# Patient Record
Sex: FEMALE | Race: WHITE | NOT HISPANIC OR LATINO | URBAN - METROPOLITAN AREA
[De-identification: names, ages, dates, MRNs, and addresses within clinical notes are randomized per-mention and may not be internally consistent; named-entity substitution may affect disease eponyms.]

---

## 2024-02-11 ENCOUNTER — INPATIENT (INPATIENT)
Facility: HOSPITAL | Age: 54
LOS: 3 days | Discharge: HOME | DRG: 139 | End: 2024-02-15
Attending: STUDENT IN AN ORGANIZED HEALTH CARE EDUCATION/TRAINING PROGRAM | Admitting: HOSPITALIST
Payer: MEDICAID

## 2024-02-11 VITALS
DIASTOLIC BLOOD PRESSURE: 77 MMHG | SYSTOLIC BLOOD PRESSURE: 121 MMHG | HEART RATE: 80 BPM | RESPIRATION RATE: 18 BRPM | WEIGHT: 240.08 LBS | OXYGEN SATURATION: 95 % | TEMPERATURE: 98 F

## 2024-02-11 DIAGNOSIS — F11.20 OPIOID DEPENDENCE, UNCOMPLICATED: ICD-10-CM

## 2024-02-11 DIAGNOSIS — R91.8 OTHER NONSPECIFIC ABNORMAL FINDING OF LUNG FIELD: ICD-10-CM

## 2024-02-11 DIAGNOSIS — Z87.891 PERSONAL HISTORY OF NICOTINE DEPENDENCE: ICD-10-CM

## 2024-02-11 DIAGNOSIS — J18.1 LOBAR PNEUMONIA, UNSPECIFIED ORGANISM: ICD-10-CM

## 2024-02-11 DIAGNOSIS — R79.89 OTHER SPECIFIED ABNORMAL FINDINGS OF BLOOD CHEMISTRY: ICD-10-CM

## 2024-02-11 DIAGNOSIS — R06.02 SHORTNESS OF BREATH: ICD-10-CM

## 2024-02-11 DIAGNOSIS — I10 ESSENTIAL (PRIMARY) HYPERTENSION: ICD-10-CM

## 2024-02-11 DIAGNOSIS — R59.0 LOCALIZED ENLARGED LYMPH NODES: ICD-10-CM

## 2024-02-11 DIAGNOSIS — U09.9 POST COVID-19 CONDITION, UNSPECIFIED: ICD-10-CM

## 2024-02-11 DIAGNOSIS — F41.1 GENERALIZED ANXIETY DISORDER: ICD-10-CM

## 2024-02-11 DIAGNOSIS — Z98.51 TUBAL LIGATION STATUS: Chronic | ICD-10-CM

## 2024-02-11 DIAGNOSIS — C34.32 MALIGNANT NEOPLASM OF LOWER LOBE, LEFT BRONCHUS OR LUNG: ICD-10-CM

## 2024-02-11 LAB
ALBUMIN SERPL ELPH-MCNC: 4.2 G/DL — SIGNIFICANT CHANGE UP (ref 3.5–5.2)
ALP SERPL-CCNC: 85 U/L — SIGNIFICANT CHANGE UP (ref 30–115)
ALT FLD-CCNC: 37 U/L — SIGNIFICANT CHANGE UP (ref 0–41)
ANION GAP SERPL CALC-SCNC: 8 MMOL/L — SIGNIFICANT CHANGE UP (ref 7–14)
AST SERPL-CCNC: 35 U/L — SIGNIFICANT CHANGE UP (ref 0–41)
BASE EXCESS BLDV CALC-SCNC: 2.8 MMOL/L — SIGNIFICANT CHANGE UP (ref -2–3)
BASOPHILS # BLD AUTO: 0.01 K/UL — SIGNIFICANT CHANGE UP (ref 0–0.2)
BASOPHILS NFR BLD AUTO: 0.1 % — SIGNIFICANT CHANGE UP (ref 0–1)
BILIRUB SERPL-MCNC: 0.4 MG/DL — SIGNIFICANT CHANGE UP (ref 0.2–1.2)
BUN SERPL-MCNC: 15 MG/DL — SIGNIFICANT CHANGE UP (ref 10–20)
CA-I SERPL-SCNC: 1.16 MMOL/L — SIGNIFICANT CHANGE UP (ref 1.15–1.33)
CALCIUM SERPL-MCNC: 8.9 MG/DL — SIGNIFICANT CHANGE UP (ref 8.4–10.5)
CHLORIDE SERPL-SCNC: 99 MMOL/L — SIGNIFICANT CHANGE UP (ref 98–110)
CO2 SERPL-SCNC: 28 MMOL/L — SIGNIFICANT CHANGE UP (ref 17–32)
CREAT SERPL-MCNC: 1.1 MG/DL — SIGNIFICANT CHANGE UP (ref 0.7–1.5)
EGFR: 60 ML/MIN/1.73M2 — SIGNIFICANT CHANGE UP
EOSINOPHIL # BLD AUTO: 0.05 K/UL — SIGNIFICANT CHANGE UP (ref 0–0.7)
EOSINOPHIL NFR BLD AUTO: 0.5 % — SIGNIFICANT CHANGE UP (ref 0–8)
FLUAV AG NPH QL: SIGNIFICANT CHANGE UP
FLUBV AG NPH QL: SIGNIFICANT CHANGE UP
GAS PNL BLDV: 133 MMOL/L — LOW (ref 136–145)
GAS PNL BLDV: SIGNIFICANT CHANGE UP
GLUCOSE SERPL-MCNC: 118 MG/DL — HIGH (ref 70–99)
HCG SERPL QL: NEGATIVE — SIGNIFICANT CHANGE UP
HCO3 BLDV-SCNC: 31 MMOL/L — HIGH (ref 22–29)
HCT VFR BLD CALC: 42 % — SIGNIFICANT CHANGE UP (ref 37–47)
HGB BLD-MCNC: 14 G/DL — SIGNIFICANT CHANGE UP (ref 12–16)
IMM GRANULOCYTES NFR BLD AUTO: 0.4 % — HIGH (ref 0.1–0.3)
LACTATE BLDV-MCNC: 2.1 MMOL/L — HIGH (ref 0.5–2)
LACTATE SERPL-SCNC: 2.2 MMOL/L — HIGH (ref 0.7–2)
LYMPHOCYTES # BLD AUTO: 2.18 K/UL — SIGNIFICANT CHANGE UP (ref 1.2–3.4)
LYMPHOCYTES # BLD AUTO: 21.6 % — SIGNIFICANT CHANGE UP (ref 20.5–51.1)
MCHC RBC-ENTMCNC: 29.5 PG — SIGNIFICANT CHANGE UP (ref 27–31)
MCHC RBC-ENTMCNC: 33.3 G/DL — SIGNIFICANT CHANGE UP (ref 32–37)
MCV RBC AUTO: 88.4 FL — SIGNIFICANT CHANGE UP (ref 81–99)
MONOCYTES # BLD AUTO: 0.76 K/UL — HIGH (ref 0.1–0.6)
MONOCYTES NFR BLD AUTO: 7.5 % — SIGNIFICANT CHANGE UP (ref 1.7–9.3)
NEUTROPHILS # BLD AUTO: 7.05 K/UL — HIGH (ref 1.4–6.5)
NEUTROPHILS NFR BLD AUTO: 69.9 % — SIGNIFICANT CHANGE UP (ref 42.2–75.2)
NRBC # BLD: 0 /100 WBCS — SIGNIFICANT CHANGE UP (ref 0–0)
NT-PROBNP SERPL-SCNC: 178 PG/ML — SIGNIFICANT CHANGE UP (ref 0–300)
PCO2 BLDV: 61 MMHG — HIGH (ref 39–42)
PH BLDV: 7.31 — LOW (ref 7.32–7.43)
PLATELET # BLD AUTO: 155 K/UL — SIGNIFICANT CHANGE UP (ref 130–400)
PMV BLD: 11.1 FL — HIGH (ref 7.4–10.4)
PO2 BLDV: 62 MMHG — HIGH (ref 25–45)
POTASSIUM BLDV-SCNC: 5.7 MMOL/L — HIGH (ref 3.5–5.1)
POTASSIUM SERPL-MCNC: 4.8 MMOL/L — SIGNIFICANT CHANGE UP (ref 3.5–5)
POTASSIUM SERPL-SCNC: 4.8 MMOL/L — SIGNIFICANT CHANGE UP (ref 3.5–5)
PROCALCITONIN SERPL-MCNC: 0.08 NG/ML — SIGNIFICANT CHANGE UP (ref 0.02–0.1)
PROT SERPL-MCNC: 7.5 G/DL — SIGNIFICANT CHANGE UP (ref 6–8)
RBC # BLD: 4.75 M/UL — SIGNIFICANT CHANGE UP (ref 4.2–5.4)
RBC # FLD: 13.7 % — SIGNIFICANT CHANGE UP (ref 11.5–14.5)
RSV RNA NPH QL NAA+NON-PROBE: SIGNIFICANT CHANGE UP
SAO2 % BLDV: 89.9 % — HIGH (ref 67–88)
SARS-COV-2 RNA SPEC QL NAA+PROBE: SIGNIFICANT CHANGE UP
SODIUM SERPL-SCNC: 135 MMOL/L — SIGNIFICANT CHANGE UP (ref 135–146)
TROPONIN T, HIGH SENSITIVITY RESULT: 32 NG/L — HIGH (ref 6–13)
TROPONIN T, HIGH SENSITIVITY RESULT: 38 NG/L — HIGH (ref 6–13)
WBC # BLD: 10.09 K/UL — SIGNIFICANT CHANGE UP (ref 4.8–10.8)
WBC # FLD AUTO: 10.09 K/UL — SIGNIFICANT CHANGE UP (ref 4.8–10.8)

## 2024-02-11 PROCEDURE — 93010 ELECTROCARDIOGRAM REPORT: CPT

## 2024-02-11 PROCEDURE — 85025 COMPLETE CBC W/AUTO DIFF WBC: CPT

## 2024-02-11 PROCEDURE — 36415 COLL VENOUS BLD VENIPUNCTURE: CPT

## 2024-02-11 PROCEDURE — 71045 X-RAY EXAM CHEST 1 VIEW: CPT | Mod: 26

## 2024-02-11 PROCEDURE — 94640 AIRWAY INHALATION TREATMENT: CPT

## 2024-02-11 PROCEDURE — 0225U NFCT DS DNA&RNA 21 SARSCOV2: CPT

## 2024-02-11 PROCEDURE — 99285 EMERGENCY DEPT VISIT HI MDM: CPT

## 2024-02-11 PROCEDURE — 84145 PROCALCITONIN (PCT): CPT

## 2024-02-11 PROCEDURE — 99222 1ST HOSP IP/OBS MODERATE 55: CPT

## 2024-02-11 PROCEDURE — 83605 ASSAY OF LACTIC ACID: CPT

## 2024-02-11 PROCEDURE — 71275 CT ANGIOGRAPHY CHEST: CPT

## 2024-02-11 PROCEDURE — 93306 TTE W/DOPPLER COMPLETE: CPT

## 2024-02-11 PROCEDURE — 71275 CT ANGIOGRAPHY CHEST: CPT | Mod: 26

## 2024-02-11 PROCEDURE — 80053 COMPREHEN METABOLIC PANEL: CPT

## 2024-02-11 RX ORDER — LANOLIN ALCOHOL/MO/W.PET/CERES
3 CREAM (GRAM) TOPICAL AT BEDTIME
Refills: 0 | Status: DISCONTINUED | OUTPATIENT
Start: 2024-02-11 | End: 2024-02-15

## 2024-02-11 RX ORDER — IPRATROPIUM/ALBUTEROL SULFATE 18-103MCG
3 AEROSOL WITH ADAPTER (GRAM) INHALATION EVERY 4 HOURS
Refills: 0 | Status: DISCONTINUED | OUTPATIENT
Start: 2024-02-11 | End: 2024-02-15

## 2024-02-11 RX ORDER — ACETAMINOPHEN 500 MG
650 TABLET ORAL EVERY 6 HOURS
Refills: 0 | Status: DISCONTINUED | OUTPATIENT
Start: 2024-02-11 | End: 2024-02-15

## 2024-02-11 RX ORDER — ONDANSETRON 8 MG/1
4 TABLET, FILM COATED ORAL EVERY 8 HOURS
Refills: 0 | Status: DISCONTINUED | OUTPATIENT
Start: 2024-02-11 | End: 2024-02-15

## 2024-02-11 RX ORDER — IPRATROPIUM/ALBUTEROL SULFATE 18-103MCG
3 AEROSOL WITH ADAPTER (GRAM) INHALATION ONCE
Refills: 0 | Status: COMPLETED | OUTPATIENT
Start: 2024-02-11 | End: 2024-02-11

## 2024-02-11 RX ORDER — ALPRAZOLAM 0.25 MG
2 TABLET ORAL THREE TIMES A DAY
Refills: 0 | Status: DISCONTINUED | OUTPATIENT
Start: 2024-02-11 | End: 2024-02-15

## 2024-02-11 RX ORDER — LISINOPRIL 2.5 MG/1
10 TABLET ORAL DAILY
Refills: 0 | Status: DISCONTINUED | OUTPATIENT
Start: 2024-02-11 | End: 2024-02-15

## 2024-02-11 RX ORDER — ENOXAPARIN SODIUM 100 MG/ML
40 INJECTION SUBCUTANEOUS EVERY 24 HOURS
Refills: 0 | Status: DISCONTINUED | OUTPATIENT
Start: 2024-02-11 | End: 2024-02-15

## 2024-02-11 RX ORDER — CHLORHEXIDINE GLUCONATE 213 G/1000ML
1 SOLUTION TOPICAL
Refills: 0 | Status: DISCONTINUED | OUTPATIENT
Start: 2024-02-11 | End: 2024-02-15

## 2024-02-11 RX ORDER — METHADONE HYDROCHLORIDE 40 MG/1
120 TABLET ORAL
Refills: 0 | DISCHARGE

## 2024-02-11 RX ORDER — CEFTRIAXONE 500 MG/1
1000 INJECTION, POWDER, FOR SOLUTION INTRAMUSCULAR; INTRAVENOUS ONCE
Refills: 0 | Status: COMPLETED | OUTPATIENT
Start: 2024-02-11 | End: 2024-02-11

## 2024-02-11 RX ORDER — GUAIFENESIN/DEXTROMETHORPHAN 600MG-30MG
10 TABLET, EXTENDED RELEASE 12 HR ORAL EVERY 6 HOURS
Refills: 0 | Status: DISCONTINUED | OUTPATIENT
Start: 2024-02-11 | End: 2024-02-15

## 2024-02-11 RX ORDER — CEFTRIAXONE 500 MG/1
1000 INJECTION, POWDER, FOR SOLUTION INTRAMUSCULAR; INTRAVENOUS EVERY 24 HOURS
Refills: 0 | Status: DISCONTINUED | OUTPATIENT
Start: 2024-02-11 | End: 2024-02-15

## 2024-02-11 RX ORDER — INFLUENZA VIRUS VACCINE 15; 15; 15; 15 UG/.5ML; UG/.5ML; UG/.5ML; UG/.5ML
0.5 SUSPENSION INTRAMUSCULAR ONCE
Refills: 0 | Status: DISCONTINUED | OUTPATIENT
Start: 2024-02-11 | End: 2024-02-15

## 2024-02-11 RX ORDER — IPRATROPIUM/ALBUTEROL SULFATE 18-103MCG
3 AEROSOL WITH ADAPTER (GRAM) INHALATION
Refills: 0 | Status: DISCONTINUED | OUTPATIENT
Start: 2024-02-11 | End: 2024-02-15

## 2024-02-11 RX ORDER — AZITHROMYCIN 500 MG/1
500 TABLET, FILM COATED ORAL ONCE
Refills: 0 | Status: COMPLETED | OUTPATIENT
Start: 2024-02-11 | End: 2024-02-11

## 2024-02-11 RX ORDER — AZITHROMYCIN 500 MG/1
500 TABLET, FILM COATED ORAL EVERY 24 HOURS
Refills: 0 | Status: DISCONTINUED | OUTPATIENT
Start: 2024-02-11 | End: 2024-02-15

## 2024-02-11 RX ORDER — ALPRAZOLAM 0.25 MG
1 TABLET ORAL
Refills: 0 | DISCHARGE

## 2024-02-11 RX ADMIN — ENOXAPARIN SODIUM 40 MILLIGRAM(S): 100 INJECTION SUBCUTANEOUS at 20:52

## 2024-02-11 RX ADMIN — CEFTRIAXONE 100 MILLIGRAM(S): 500 INJECTION, POWDER, FOR SOLUTION INTRAMUSCULAR; INTRAVENOUS at 15:06

## 2024-02-11 RX ADMIN — Medication 125 MILLIGRAM(S): at 12:32

## 2024-02-11 RX ADMIN — AZITHROMYCIN 255 MILLIGRAM(S): 500 TABLET, FILM COATED ORAL at 15:06

## 2024-02-11 RX ADMIN — Medication 3 MILLILITER(S): at 12:32

## 2024-02-11 RX ADMIN — Medication 3 MILLILITER(S): at 15:12

## 2024-02-11 NOTE — PATIENT PROFILE ADULT - FALL HARM RISK - HARM RISK INTERVENTIONS

## 2024-02-11 NOTE — PATIENT PROFILE ADULT - NSPROPOAURINARYCATHETER_GEN_A_NUR
Pt due for appt & fasting BW ordered by neurologist(ordered 11/7)    pls schedule for February with me    thx no

## 2024-02-11 NOTE — PATIENT PROFILE ADULT - DEAF OR HARD OF HEARING?
ENDOCRINOLOGY CLINIC FOLLOW-UP:    Reason for Visit: primary hyperparathyroidism s/p parathyroidectomy     Last seen in clinic: 4/27/23    Interval events: surgery went well had   On 8/7/23, she underwent left superior parathyroidectomy. Takes vitamin D supplement 1,000 IU three days a week. No osteoporosis. No fractures.     History of Present Illness:   Patient said she was found to have hypercalcemia in 2022. Prior to this was followed at [facility]. Had elevated ionized calcium (11/2022) with elevated PTH level.     Fatigue: no  Depression: no   Increased urination or thirst: no   Bone pain: no  Constipation: no  Abdominal pain: no  Memory difficulties: no  Hx of kidney stones: no     Family history of calcium problems or parathyroid disease: No     Taking any thiazide diuretic or lithium? No    Any history of osteoporosis? If yes, has patient been treated with any medications?  She does not have osteoporosis. Had a DXA scan about five years ago through other health system and was normal. No hx of fractures.    No previous sestamibi scan.     The patient is taking 1,000 IU of vitamin D only three days a week. Takes a multivitamin every day. Does not take any calcium supplements.   Eats dairy.      Reviewed Labs:  Component      Latest Ref Rng 8/17/2023  7:03 AM   CALCIUM      8.4 - 10.2 mg/dL 9.5    PARATHYROID HORMONE      19 - 88 pg/mL 57    VITAMIND, 25 HYDROXY      30.0 - 100.0 ng/mL 38.7        Reviewed Imaging:  DXA scan 5/4/23:  Findings:  Lumbar spine:  L3-L4   1.499 g/cm2  T-score:  2.2   Z-score:  2.3     Lowest femoral:  Neck Right-  1.149 g/cm2  T-score:  0.8   Z-score:  1.3     Left Forearm:   Radius 33%: 0.825 g/cm2  T-score:  -0.6   Z-score: 0.4  Forearm 1/3 radius LSC: 0.055 g/cm2  The forearm was not scanned previously.     All other bone mineral density measurements and statistics are listed in  the computer-generated report available in [employer].     FRAX Risk Assessment:  The  estimated  10-year risk for a major osteoporotic fracture is  N/A% and  for a hip fracture is N/A%.  FRAX is generated if the patient's lowest T-score is between -1.0 and  -2.4.    Note* FRAX  is not valid if  the patient is under age 40,  has T score of  2.5 at the spine or hip, has history of hip or vertebral (clinical  or morphometric) fracture, or is on treatment. [email or web address]     IMPRESSION:   1. Normal bone mineral density.   2.  Optimize dietary intake of vitamin D.  3.  Consider repeat bone mineral density testing in 2 years, as clinically  appropriate.      Review of Systems:  No complaints at this time.    History:   Past medical history, surgical history, medications, allergies, family history and social history reviewed and in Epic.    Physical Exam  Visit Vitals  BP (!) 141/76   Pulse 66   Ht 5' 4\" (1.626 m)   Wt 109.6 kg (241 lb 10 oz)   LMP 12/18/2005   BMI 41.47 kg/m²   Constitutional: Resting, well nourished female and NAD.  Eyes: Anicteric and conjunctivae not pale. No exophthalmos  Head: Normocephalic, atraumatic  Neck: No overt thyromegaly  Respiratory: Non-labored. No cyanosis.   Skin: No visible rashes.    Psych: Normal mood and affect.  Musculoskeletal: JOSEPH x4, no deformities  Neuro: Awake, alert. No tremor. Speech normal.       Assessment:  Johanna Greer is a pleasant 60 year old female with medical history including primary hyperparathyroidism s/p left superior 8/7/23 with Dr. Phoenix, who presents for f/u. She does not have osteoporosis. Doing well. Taking vitamin D supplement. Eats dairy daily.    RTC: prn    I spent a total of 30 minutes on the day of the visit.  This includes pre-charting, chart review, and documenting, counseling.    Scarlet Bach MD, A  Endocrinology                  no

## 2024-02-11 NOTE — ED PROVIDER NOTE - ATTENDING APP SHARED VISIT CONTRIBUTION OF CARE
Pt reports cough, shortness of breath, at home saturations in the 80'S. Hx of methadone 120 mg, xanax 2 mg tid. Pt has COVID Dec 2022 and since then her lungs have not recovered. no chest pain. No leg pain. on exam S1S2 reg, tachy, lungs bilateral rhonchi and wet rales, no abdominal tenderness, no edema of lower extremity.

## 2024-02-11 NOTE — ED ADULT TRIAGE NOTE - CHIEF COMPLAINT QUOTE
BIBA via [facility] from home- pt complaining of shortness of breath and fever that has been present since yesterday.

## 2024-02-11 NOTE — H&P ADULT - NS ATTEND AMEND GEN_ALL_CORE FT
Pt presents with complaints of SOB and "gurgly" breathing.    Overall impression is likely viral URI not detected on ER COVID/flu/RSV panel.  Send full RVP once out of ER.  Check procal, trend lactate.  -Pt is saturating at 95% on RA, CXR is clear  -Breath sounds are abnormal likely due to upper airway secretions-give expectorant  -Will get CTA chest to r/o PE (tachycardic, SOB) and for better eval of lung fields given normal appearing CXR as well as TTE (b/l LE edema with SOB and complaints of HOPE).  -Troponin has downtrended, no need to repeat, patient does not need telemetry.    Continue home meds as above, will need to verify methadone dose with program in [place].

## 2024-02-11 NOTE — H&P ADULT - NSHPPHYSICALEXAM_GEN_ALL_CORE
Vital Signs (24 Hrs):  T(C): 36.8 (02-11-24 @ 12:12), Max: 36.8 (02-11-24 @ 12:12)  HR: 80 (02-11-24 @ 12:12) (80 - 80)  BP: 121/77 (02-11-24 @ 12:12) (121/77 - 121/77)  RR: 18 (02-11-24 @ 12:12) (18 - 18)  SpO2: 95% (02-11-24 @ 12:12) (95% - 95%)    PHYSICAL EXAM:  GENERAL: NAD, well-developed  SKIN: No rashes or lesions  HEAD:  Atraumatic, Normocephalic  EYES: EOMI, PERRLA, conjunctiva and sclera clear  NECK: Supple, No JVD  CHEST/LUNG: + wheeze b/l  HEART: Regular rate and rhythm; No murmurs, rubs, or gallops  ABDOMEN: Soft, Nontender, Nondistended; Bowel sounds present  EXTREMITIES:  No clubbing, cyanosis. 1+ BLE pitting edema  CNS: AAOx3

## 2024-02-11 NOTE — ED PROVIDER NOTE - PHYSICAL EXAMINATION
Physical Exam    Vital Signs: I have reviewed the initial vital signs.  Constitutional: appears stated age, no acute distress  Eyes: Conjunctiva pink, Sclera clear,  Cardiovascular: S1 and S2, regular rate, regular rhythm, well-perfused extremities, radial pulses equal and 2+, pedal pulses 2+ and equal  Respiratory: unlabored respiratory effort, Diffuse rhonchi and end expiratory wheezing  Gastrointestinal: soft, non-tender abdomen, no pulsatile mass, normal bowl sounds  Musculoskeletal: supple neck, no lower extremity edema, no midline tenderness  Integumentary: warm, dry, no rash  Neurologic: awake, alert, nvi

## 2024-02-11 NOTE — H&P ADULT - ASSESSMENT
53F w/PMHx of HTN, Opiate Dependence on MMTP, ISSA presents to ED with her  with complaints of SOB, admitted to medicine service for further management    #SOB, unclear etiology  - admit to medicine service  - c/w CTX and Azithromycin  - b2 q4h ATC while awake and PRN  - Robitussin DM PRN  - check CTA PE protocol  - check Procal  - check full RVP  - check TTE  - recheck lactate in AM    #Elevated Troponin  - 38 -> 32, no further need to trend  - d/c tele    #Opiate Dependence  - on Methadone 120mg QD  - Dose needs to be verified: Please call [facility]MTP in AM  for verification.  [phone]    #HTN  - c/w Lisinopril  - DASH Diet    #ISSA  - Alprazolam PRN per home schedule  - istop Reference #: [id number]    Diet: DASH  Activity: OOB  DVT PPX: Loveonox  Code status: Full  Dispo: Home  CHG 2% Wipes QD

## 2024-02-11 NOTE — ED PROVIDER NOTE - OBJECTIVE STATEMENT
53-year-old female, past medical history of opiate dependency on methadone, takes Xanax, previous COVID infection 2022 states lungs have never been the same since then, presents emergency department shortness of breath several days.  Worse with exertion. Denies fever, chills, cp, abd pain, nvd, syncope, cough.

## 2024-02-11 NOTE — ED PROVIDER NOTE - NS_BEDUNITTYPES_ED_ALL_ED
Stelara Pregnancy And Lactation Text: This medication is Pregnancy Category B and is considered safe during pregnancy. It is unknown if this medication is excreted in breast milk. TELEMETRY

## 2024-02-11 NOTE — H&P ADULT - HISTORY OF PRESENT ILLNESS
53F w/PMHx of HTN, Opiate Dependence on MMTP, ISSA presents to ED with her  with complaints of SOB.  Patient reports worsening SOB and cough with fever over the past 7-10 days.   reports patient has been gurgling over the past week.  + cough, no production.  + Fever, unknown TMAX.  + HOPE, no SOB at rest.  No abdominal pain N/V.  No urinary symptoms  + associated weakness.  States had COVID-19 in 2022, since then has had multiple medical problems

## 2024-02-11 NOTE — H&P ADULT - NSHPLABSRESULTS_GEN_ALL_CORE
14.0   10.09 )-----------( 155      ( 11 Feb 2024 13:00 )             42.0       02-11    135  |  99  |  15  ----------------------------<  118<H>  4.8   |  28  |  1.1    Ca    8.9      11 Feb 2024 13:00    TPro  7.5  /  Alb  4.2  /  TBili  0.4  /  DBili  x   /  AST  35  /  ALT  37  /  AlkPhos  85  02-11                  Urinalysis Basic - ( 11 Feb 2024 13:00 )    Color: x / Appearance: x / SG: x / pH: x  Gluc: 118 mg/dL / Ketone: x  / Bili: x / Urobili: x   Blood: x / Protein: x / Nitrite: x   Leuk Esterase: x / RBC: x / WBC x   Sq Epi: x / Non Sq Epi: x / Bacteria: x      Lactate Trend  02-11 @ 16:00 Lactate:2.2     CXR    Impression:    Low lung volumes. No definite acute pulmonary process.              Serum Pregnancy: Negative (02-11-24 @ 13:00)

## 2024-02-12 LAB
ALBUMIN SERPL ELPH-MCNC: 3.9 G/DL — SIGNIFICANT CHANGE UP (ref 3.5–5.2)
ALP SERPL-CCNC: 72 U/L — SIGNIFICANT CHANGE UP (ref 30–115)
ALT FLD-CCNC: 45 U/L — HIGH (ref 0–41)
ANION GAP SERPL CALC-SCNC: 12 MMOL/L — SIGNIFICANT CHANGE UP (ref 7–14)
AST SERPL-CCNC: 71 U/L — HIGH (ref 0–41)
BASOPHILS # BLD AUTO: 0.01 K/UL — SIGNIFICANT CHANGE UP (ref 0–0.2)
BASOPHILS NFR BLD AUTO: 0.1 % — SIGNIFICANT CHANGE UP (ref 0–1)
BILIRUB SERPL-MCNC: 0.4 MG/DL — SIGNIFICANT CHANGE UP (ref 0.2–1.2)
BUN SERPL-MCNC: 24 MG/DL — HIGH (ref 10–20)
CALCIUM SERPL-MCNC: 8.9 MG/DL — SIGNIFICANT CHANGE UP (ref 8.4–10.5)
CHLORIDE SERPL-SCNC: 99 MMOL/L — SIGNIFICANT CHANGE UP (ref 98–110)
CO2 SERPL-SCNC: 26 MMOL/L — SIGNIFICANT CHANGE UP (ref 17–32)
CREAT SERPL-MCNC: 1.1 MG/DL — SIGNIFICANT CHANGE UP (ref 0.7–1.5)
EGFR: 60 ML/MIN/1.73M2 — SIGNIFICANT CHANGE UP
EOSINOPHIL # BLD AUTO: 0 K/UL — SIGNIFICANT CHANGE UP (ref 0–0.7)
EOSINOPHIL NFR BLD AUTO: 0 % — SIGNIFICANT CHANGE UP (ref 0–8)
GLUCOSE SERPL-MCNC: 140 MG/DL — HIGH (ref 70–99)
HCT VFR BLD CALC: 39.9 % — SIGNIFICANT CHANGE UP (ref 37–47)
HGB BLD-MCNC: 12.6 G/DL — SIGNIFICANT CHANGE UP (ref 12–16)
IMM GRANULOCYTES NFR BLD AUTO: 0.3 % — SIGNIFICANT CHANGE UP (ref 0.1–0.3)
LACTATE SERPL-SCNC: 1.9 MMOL/L — SIGNIFICANT CHANGE UP (ref 0.7–2)
LYMPHOCYTES # BLD AUTO: 1.52 K/UL — SIGNIFICANT CHANGE UP (ref 1.2–3.4)
LYMPHOCYTES # BLD AUTO: 15.3 % — LOW (ref 20.5–51.1)
MCHC RBC-ENTMCNC: 28.9 PG — SIGNIFICANT CHANGE UP (ref 27–31)
MCHC RBC-ENTMCNC: 31.6 G/DL — LOW (ref 32–37)
MCV RBC AUTO: 91.5 FL — SIGNIFICANT CHANGE UP (ref 81–99)
MONOCYTES # BLD AUTO: 0.6 K/UL — SIGNIFICANT CHANGE UP (ref 0.1–0.6)
MONOCYTES NFR BLD AUTO: 6 % — SIGNIFICANT CHANGE UP (ref 1.7–9.3)
NEUTROPHILS # BLD AUTO: 7.8 K/UL — HIGH (ref 1.4–6.5)
NEUTROPHILS NFR BLD AUTO: 78.3 % — HIGH (ref 42.2–75.2)
NRBC # BLD: 0 /100 WBCS — SIGNIFICANT CHANGE UP (ref 0–0)
PLATELET # BLD AUTO: 134 K/UL — SIGNIFICANT CHANGE UP (ref 130–400)
PMV BLD: 11.9 FL — HIGH (ref 7.4–10.4)
POTASSIUM SERPL-MCNC: 5.4 MMOL/L — HIGH (ref 3.5–5)
POTASSIUM SERPL-SCNC: 5.4 MMOL/L — HIGH (ref 3.5–5)
PROT SERPL-MCNC: 7.2 G/DL — SIGNIFICANT CHANGE UP (ref 6–8)
RBC # BLD: 4.36 M/UL — SIGNIFICANT CHANGE UP (ref 4.2–5.4)
RBC # FLD: 14 % — SIGNIFICANT CHANGE UP (ref 11.5–14.5)
SODIUM SERPL-SCNC: 137 MMOL/L — SIGNIFICANT CHANGE UP (ref 135–146)
WBC # BLD: 9.96 K/UL — SIGNIFICANT CHANGE UP (ref 4.8–10.8)
WBC # FLD AUTO: 9.96 K/UL — SIGNIFICANT CHANGE UP (ref 4.8–10.8)

## 2024-02-12 PROCEDURE — 93306 TTE W/DOPPLER COMPLETE: CPT | Mod: 26

## 2024-02-12 PROCEDURE — 99232 SBSQ HOSP IP/OBS MODERATE 35: CPT

## 2024-02-12 RX ORDER — METHADONE HYDROCHLORIDE 40 MG/1
120 TABLET ORAL DAILY
Refills: 0 | Status: DISCONTINUED | OUTPATIENT
Start: 2024-02-12 | End: 2024-02-15

## 2024-02-12 RX ORDER — SODIUM ZIRCONIUM CYCLOSILICATE 10 G/10G
10 POWDER, FOR SUSPENSION ORAL ONCE
Refills: 0 | Status: COMPLETED | OUTPATIENT
Start: 2024-02-12 | End: 2024-02-12

## 2024-02-12 RX ADMIN — CEFTRIAXONE 100 MILLIGRAM(S): 500 INJECTION, POWDER, FOR SOLUTION INTRAMUSCULAR; INTRAVENOUS at 17:43

## 2024-02-12 RX ADMIN — CHLORHEXIDINE GLUCONATE 1 APPLICATION(S): 213 SOLUTION TOPICAL at 05:27

## 2024-02-12 RX ADMIN — ENOXAPARIN SODIUM 40 MILLIGRAM(S): 100 INJECTION SUBCUTANEOUS at 21:08

## 2024-02-12 RX ADMIN — Medication 2 MILLIGRAM(S): at 11:53

## 2024-02-12 RX ADMIN — METHADONE HYDROCHLORIDE 120 MILLIGRAM(S): 40 TABLET ORAL at 11:19

## 2024-02-12 RX ADMIN — SODIUM ZIRCONIUM CYCLOSILICATE 10 GRAM(S): 10 POWDER, FOR SUSPENSION ORAL at 22:22

## 2024-02-12 RX ADMIN — ONDANSETRON 4 MILLIGRAM(S): 8 TABLET, FILM COATED ORAL at 18:38

## 2024-02-12 RX ADMIN — Medication 3 MILLILITER(S): at 16:12

## 2024-02-12 RX ADMIN — AZITHROMYCIN 255 MILLIGRAM(S): 500 TABLET, FILM COATED ORAL at 18:18

## 2024-02-12 RX ADMIN — Medication 3 MILLILITER(S): at 07:32

## 2024-02-12 RX ADMIN — Medication 2 MILLIGRAM(S): at 18:05

## 2024-02-13 LAB
ALBUMIN SERPL ELPH-MCNC: 3.6 G/DL — SIGNIFICANT CHANGE UP (ref 3.5–5.2)
ALP SERPL-CCNC: 67 U/L — SIGNIFICANT CHANGE UP (ref 30–115)
ALT FLD-CCNC: 55 U/L — HIGH (ref 0–41)
ANION GAP SERPL CALC-SCNC: 7 MMOL/L — SIGNIFICANT CHANGE UP (ref 7–14)
AST SERPL-CCNC: 122 U/L — HIGH (ref 0–41)
BASOPHILS # BLD AUTO: 0.03 K/UL — SIGNIFICANT CHANGE UP (ref 0–0.2)
BASOPHILS NFR BLD AUTO: 0.3 % — SIGNIFICANT CHANGE UP (ref 0–1)
BILIRUB SERPL-MCNC: 0.2 MG/DL — SIGNIFICANT CHANGE UP (ref 0.2–1.2)
BUN SERPL-MCNC: 20 MG/DL — SIGNIFICANT CHANGE UP (ref 10–20)
CALCIUM SERPL-MCNC: 8.3 MG/DL — LOW (ref 8.4–10.5)
CHLORIDE SERPL-SCNC: 101 MMOL/L — SIGNIFICANT CHANGE UP (ref 98–110)
CO2 SERPL-SCNC: 30 MMOL/L — SIGNIFICANT CHANGE UP (ref 17–32)
CREAT SERPL-MCNC: 0.7 MG/DL — SIGNIFICANT CHANGE UP (ref 0.7–1.5)
EGFR: 103 ML/MIN/1.73M2 — SIGNIFICANT CHANGE UP
EOSINOPHIL # BLD AUTO: 0.15 K/UL — SIGNIFICANT CHANGE UP (ref 0–0.7)
EOSINOPHIL NFR BLD AUTO: 1.7 % — SIGNIFICANT CHANGE UP (ref 0–8)
GLUCOSE SERPL-MCNC: 114 MG/DL — HIGH (ref 70–99)
HCT VFR BLD CALC: 39.9 % — SIGNIFICANT CHANGE UP (ref 37–47)
HGB BLD-MCNC: 12.9 G/DL — SIGNIFICANT CHANGE UP (ref 12–16)
IMM GRANULOCYTES NFR BLD AUTO: 0.2 % — SIGNIFICANT CHANGE UP (ref 0.1–0.3)
LYMPHOCYTES # BLD AUTO: 2.3 K/UL — SIGNIFICANT CHANGE UP (ref 1.2–3.4)
LYMPHOCYTES # BLD AUTO: 26 % — SIGNIFICANT CHANGE UP (ref 20.5–51.1)
MCHC RBC-ENTMCNC: 29.1 PG — SIGNIFICANT CHANGE UP (ref 27–31)
MCHC RBC-ENTMCNC: 32.3 G/DL — SIGNIFICANT CHANGE UP (ref 32–37)
MCV RBC AUTO: 90.1 FL — SIGNIFICANT CHANGE UP (ref 81–99)
MONOCYTES # BLD AUTO: 0.85 K/UL — HIGH (ref 0.1–0.6)
MONOCYTES NFR BLD AUTO: 9.6 % — HIGH (ref 1.7–9.3)
NEUTROPHILS # BLD AUTO: 5.48 K/UL — SIGNIFICANT CHANGE UP (ref 1.4–6.5)
NEUTROPHILS NFR BLD AUTO: 62.2 % — SIGNIFICANT CHANGE UP (ref 42.2–75.2)
NRBC # BLD: 0 /100 WBCS — SIGNIFICANT CHANGE UP (ref 0–0)
PLATELET # BLD AUTO: 132 K/UL — SIGNIFICANT CHANGE UP (ref 130–400)
PMV BLD: 11.1 FL — HIGH (ref 7.4–10.4)
POTASSIUM SERPL-MCNC: 4.5 MMOL/L — SIGNIFICANT CHANGE UP (ref 3.5–5)
POTASSIUM SERPL-SCNC: 4.5 MMOL/L — SIGNIFICANT CHANGE UP (ref 3.5–5)
PROT SERPL-MCNC: 6.6 G/DL — SIGNIFICANT CHANGE UP (ref 6–8)
RAPID RVP RESULT: SIGNIFICANT CHANGE UP
RBC # BLD: 4.43 M/UL — SIGNIFICANT CHANGE UP (ref 4.2–5.4)
RBC # FLD: 14.3 % — SIGNIFICANT CHANGE UP (ref 11.5–14.5)
SARS-COV-2 RNA SPEC QL NAA+PROBE: SIGNIFICANT CHANGE UP
SODIUM SERPL-SCNC: 138 MMOL/L — SIGNIFICANT CHANGE UP (ref 135–146)
WBC # BLD: 8.83 K/UL — SIGNIFICANT CHANGE UP (ref 4.8–10.8)
WBC # FLD AUTO: 8.83 K/UL — SIGNIFICANT CHANGE UP (ref 4.8–10.8)

## 2024-02-13 PROCEDURE — 99222 1ST HOSP IP/OBS MODERATE 55: CPT | Mod: 1L

## 2024-02-13 PROCEDURE — 99232 SBSQ HOSP IP/OBS MODERATE 35: CPT

## 2024-02-13 RX ADMIN — METHADONE HYDROCHLORIDE 120 MILLIGRAM(S): 40 TABLET ORAL at 11:07

## 2024-02-13 RX ADMIN — CHLORHEXIDINE GLUCONATE 1 APPLICATION(S): 213 SOLUTION TOPICAL at 05:48

## 2024-02-13 RX ADMIN — ONDANSETRON 4 MILLIGRAM(S): 8 TABLET, FILM COATED ORAL at 17:40

## 2024-02-13 RX ADMIN — CEFTRIAXONE 100 MILLIGRAM(S): 500 INJECTION, POWDER, FOR SOLUTION INTRAMUSCULAR; INTRAVENOUS at 17:25

## 2024-02-13 RX ADMIN — Medication 3 MILLILITER(S): at 07:50

## 2024-02-13 RX ADMIN — LISINOPRIL 10 MILLIGRAM(S): 2.5 TABLET ORAL at 05:47

## 2024-02-13 RX ADMIN — Medication 3 MILLILITER(S): at 19:24

## 2024-02-13 RX ADMIN — AZITHROMYCIN 255 MILLIGRAM(S): 500 TABLET, FILM COATED ORAL at 17:25

## 2024-02-13 RX ADMIN — Medication 2 MILLIGRAM(S): at 11:32

## 2024-02-13 RX ADMIN — Medication 2 MILLIGRAM(S): at 17:40

## 2024-02-13 RX ADMIN — Medication 3 MILLILITER(S): at 15:35

## 2024-02-13 NOTE — CONSULT NOTE ADULT - ASSESSMENT
LLL nodular infiltrate likely pneumonia  cannot r/o additional neoplastic process  mediastinal adenopathy reactive v. malignant    SUGGEST:   cont abx  bronchodilators  continue O2 as necessary to maintain sats > 90%<94  DVT/Gastritis prophylaxis  pt needs repeat CT chest in 2-3 months as OP to re evaluate these findings  if findings worse or still present will need FOB with EBUS

## 2024-02-13 NOTE — CONSULT NOTE ADULT - SUBJECTIVE AND OBJECTIVE BOX
HPI:  53F w/PMHx of HTN, Opiate Dependence on MMTP, ISSA presents to ED with her  with complaints of SOB.  Patient reports worsening SOB and cough with fever over the past 7-10 days.   reports patient has been gurgling over the past week.  + cough, no production.  + Fever, unknown TMAX.  + HOPE, no SOB at rest.  No abdominal pain N/V.  No urinary symptoms  + associated weakness.  States had COVID-19 in 2022, since then has had multiple medical problems  (11 Feb 2024 16:57)       I reviewed the radiology tests and hospital record including the ED chart.    I reviewed the other consultants comments that are available in the chart.    CC/ HPI Patient is a 53y old  Female who presents with a chief complaint of SOB and weakness x 1 week (12 Feb 2024 19:18)      Shortness of breath    Benign essential HTN    Opiate dependence    Shortness of breath    H/O tubal ligation    SHORTNESS OF BREATH    [email or web address]        FAMILY HISTORY:      No Known Allergies      Soc:  see Hpi.    Home prescriptions  ALPRAZolam 2 mg oral tablet: 1 tab(s) orally 3 times a day as needed for  anxiety  lisinopril 10 mg oral tablet: 1 tab(s) orally once a day  methadone 10 mg oral tablet: 120 tab(s) orally once a day      MEDICATIONS  (STANDING):  albuterol/ipratropium for Nebulization 3 milliLiter(s) Nebulizer every 4 hours  albuterol/ipratropium for Nebulization.. 3 milliLiter(s) Nebulizer every 20 minutes  azithromycin  IVPB 500 milliGRAM(s) IV Intermittent every 24 hours  cefTRIAXone   IVPB 1000 milliGRAM(s) IV Intermittent every 24 hours  chlorhexidine 2% Cloths 1 Application(s) Topical <User Schedule>  enoxaparin Injectable 40 milliGRAM(s) SubCutaneous every 24 hours  influenza   Vaccine 0.5 milliLiter(s) IntraMuscular once  lisinopril 10 milliGRAM(s) Oral daily  methadone    Tablet 120 milliGRAM(s) Oral daily    MEDICATIONS  (PRN):  acetaminophen     Tablet .. 650 milliGRAM(s) Oral every 6 hours PRN Temp greater or equal to 38C (100.4F), Mild Pain (1 - 3)  albuterol/ipratropium for Nebulization 3 milliLiter(s) Nebulizer every 4 hours PRN Bronchospasm  ALPRAZolam 2 milliGRAM(s) Oral three times a day PRN for anxiety  aluminum hydroxide/magnesium hydroxide/simethicone Suspension 30 milliLiter(s) Oral every 4 hours PRN Dyspepsia  guaifenesin/dextromethorphan Oral Liquid 10 milliLiter(s) Oral every 6 hours PRN Cough  melatonin 3 milliGRAM(s) Oral at bedtime PRN Insomnia  ondansetron Injectable 4 milliGRAM(s) IV Push every 8 hours PRN Nausea and/or Vomiting          T(C): 36.1 (02-12-24 @ 21:05), Max: 36.1 (02-12-24 @ 13:56)  HR: 93 (02-12-24 @ 21:05) (93 - 93)  BP: 142/87 (02-12-24 @ 21:05) (111/80 - 142/87)  RR: 18 (02-12-24 @ 21:05) (18 - 18)  SpO2: 96% (02-12-24 @ 21:05) (96% - 96%)  ICU Vital Signs Last 24 Hrs  T(C): 36.1 (12 Feb 2024 21:05), Max: 36.1 (12 Feb 2024 13:56)  T(F): 97 (12 Feb 2024 21:05), Max: 97 (12 Feb 2024 21:05)  HR: 93 (12 Feb 2024 21:05) (93 - 93)  BP: 142/87 (12 Feb 2024 21:05) (111/80 - 142/87)  RR: 18 (12 Feb 2024 21:05) (18 - 18)  SpO2: 96% (12 Feb 2024 21:05) (96% - 96%)    O2 Parameters below as of 12 Feb 2024 21:05  Patient On (Oxygen Delivery Method): nasal cannula  O2 Flow (L/min): 3          I&O's Summary      I&O's Detail      Drug Dosing Weight    Weight (kg): 108.9 (11 Feb 2024 12:12)    LABS:                          12.6   9.96  )-----------( 134      ( 12 Feb 2024 06:22 )             39.9       02-12    137  |  99  |  24<H>  ----------------------------<  140<H>  5.4<H>   |  26  |  1.1    Ca    8.9      12 Feb 2024 06:22    TPro  7.2  /  Alb  3.9  /  TBili  0.4  /  DBili  x   /  AST  71<H>  /  ALT  45<H>  /  AlkPhos  72  02-12      LIVER FUNCTIONS - ( 12 Feb 2024 06:22 )  Alb: 3.9 g/dL / Pro: 7.2 g/dL / ALK PHOS: 72 U/L / ALT: 45 U/L / AST: 71 U/L / GGT: x           Lactate, Blood: 1.9 mmol/L (02-12-24 @ 06:22)      Procalcitonin, Serum: 0.08 ng/mL (02-11-24 @ 17:00    Urinalysis Basic - ( 12 Feb 2024 06:22 )    Color: x / Appearance: x / SG: x / pH: x  Gluc: 140 mg/dL / Ketone: x  / Bili: x / Urobili: x   Blood: x / Protein: x / Nitrite: x   Leuk Esterase: x / RBC: x / WBC x   Sq Epi: x / Non Sq Epi: x / Bacteria: x    azithromycin  IVPB 500 milliGRAM(s) IV Intermittent every 24 hours  cefTRIAXone   IVPB 1000 milliGRAM(s) IV Intermittent every 24 hours                  RADIOLOGY:  I have personally reviewed all chest and other pertinent radiology films.         REVIEW OF SYSTEMS:   see Hpi.    PHYSICAL EXAM:       · ENMT:   Airway patent,   Nasal mucosa clear.  Mouth with normal mucosa.   No thrush    · EYES:   Clear bilaterally,   pupils equal,   round and reactive to light.    · CARDIAC:   Normal rate,   regular rhythm.    Heart sounds S1, S2.   no thrills or bruits on palpitation  normal  cardiac impulse  No murmurs, no rubs or gallops on auscultation  no edema        CAROTID:   normal systolic impulse  no bruits    · RESPIRATORY:   no w/r/r/,   normal chest expansion  not tachypneic,  no retractions or use of accessory muscles  palpation of chest is normal with no fremitus  percussion of chest demonstrates no hyperresonance or dullness    · GASTROINTESTINAL:  Abdomen soft,   non-tender,   no guarding,   + BS  liver spleen not palpable      · MUSCULOSKELETAL:   range of motion is not limited,  no clubbing, cyanosis  no petechiae      · SKIN:   Skin normal color for race,   warm,   dry and intact.       · HEME LYMPH:   no splenomegaly.  No cervical  lymphadenopathy.  no inguinal lymphadenopathy         HPI:  53F w/PMHx of HTN, Opiate Dependence on MMTP, ISSA presents to ED with her  with complaints of SOB.  Patient reports worsening SOB and cough with fever over the past 7-10 days.   reports patient has been gurgling over the past week.  + cough, no production.  + Fever, unknown TMAX.  + HOPE, no SOB at rest.  No abdominal pain N/V.  No urinary symptoms  + associated weakness.  States had COVID-19 in 2022, since then has had multiple medical problems  (11 Feb 2024 16:57)       I reviewed the radiology tests and hospital record including the ED chart.    I reviewed the other consultants comments that are available in the chart.    CC/ HPI Patient is a 53y old  Female who presents with a chief complaint of SOB and weakness x 1 week (12 Feb 2024 19:18)      Shortness of breath    Benign essential HTN    Opiate dependence    Shortness of breath    H/O tubal ligation    SHORTNESS OF BREATH        FAMILY HISTORY:      No Known Allergies      Soc:  see Hpi.    Home prescriptions  ALPRAZolam 2 mg oral tablet: 1 tab(s) orally 3 times a day as needed for  anxiety  lisinopril 10 mg oral tablet: 1 tab(s) orally once a day  methadone 10 mg oral tablet: 120 tab(s) orally once a day      MEDICATIONS  (STANDING):  albuterol/ipratropium for Nebulization 3 milliLiter(s) Nebulizer every 4 hours  albuterol/ipratropium for Nebulization.. 3 milliLiter(s) Nebulizer every 20 minutes  azithromycin  IVPB 500 milliGRAM(s) IV Intermittent every 24 hours  cefTRIAXone   IVPB 1000 milliGRAM(s) IV Intermittent every 24 hours  chlorhexidine 2% Cloths 1 Application(s) Topical <User Schedule>  enoxaparin Injectable 40 milliGRAM(s) SubCutaneous every 24 hours  influenza   Vaccine 0.5 milliLiter(s) IntraMuscular once  lisinopril 10 milliGRAM(s) Oral daily  methadone    Tablet 120 milliGRAM(s) Oral daily    MEDICATIONS  (PRN):  acetaminophen     Tablet .. 650 milliGRAM(s) Oral every 6 hours PRN Temp greater or equal to 38C (100.4F), Mild Pain (1 - 3)  albuterol/ipratropium for Nebulization 3 milliLiter(s) Nebulizer every 4 hours PRN Bronchospasm  ALPRAZolam 2 milliGRAM(s) Oral three times a day PRN for anxiety  aluminum hydroxide/magnesium hydroxide/simethicone Suspension 30 milliLiter(s) Oral every 4 hours PRN Dyspepsia  guaifenesin/dextromethorphan Oral Liquid 10 milliLiter(s) Oral every 6 hours PRN Cough  melatonin 3 milliGRAM(s) Oral at bedtime PRN Insomnia  ondansetron Injectable 4 milliGRAM(s) IV Push every 8 hours PRN Nausea and/or Vomiting          T(C): 36.1 (02-12-24 @ 21:05), Max: 36.1 (02-12-24 @ 13:56)  HR: 93 (02-12-24 @ 21:05) (93 - 93)  BP: 142/87 (02-12-24 @ 21:05) (111/80 - 142/87)  RR: 18 (02-12-24 @ 21:05) (18 - 18)  SpO2: 96% (02-12-24 @ 21:05) (96% - 96%)  ICU Vital Signs Last 24 Hrs  T(C): 36.1 (12 Feb 2024 21:05), Max: 36.1 (12 Feb 2024 13:56)  T(F): 97 (12 Feb 2024 21:05), Max: 97 (12 Feb 2024 21:05)  HR: 93 (12 Feb 2024 21:05) (93 - 93)  BP: 142/87 (12 Feb 2024 21:05) (111/80 - 142/87)  RR: 18 (12 Feb 2024 21:05) (18 - 18)  SpO2: 96% (12 Feb 2024 21:05) (96% - 96%)    O2 Parameters below as of 12 Feb 2024 21:05  Patient On (Oxygen Delivery Method): nasal cannula  O2 Flow (L/min): 3          I&O's Summary      I&O's Detail      Drug Dosing Weight    Weight (kg): 108.9 (11 Feb 2024 12:12)    LABS:                          12.6   9.96  )-----------( 134      ( 12 Feb 2024 06:22 )             39.9       02-12    137  |  99  |  24<H>  ----------------------------<  140<H>  5.4<H>   |  26  |  1.1    Ca    8.9      12 Feb 2024 06:22    TPro  7.2  /  Alb  3.9  /  TBili  0.4  /  DBili  x   /  AST  71<H>  /  ALT  45<H>  /  AlkPhos  72  02-12      LIVER FUNCTIONS - ( 12 Feb 2024 06:22 )  Alb: 3.9 g/dL / Pro: 7.2 g/dL / ALK PHOS: 72 U/L / ALT: 45 U/L / AST: 71 U/L / GGT: x           Lactate, Blood: 1.9 mmol/L (02-12-24 @ 06:22)      Procalcitonin, Serum: 0.08 ng/mL (02-11-24 @ 17:00    Urinalysis Basic - ( 12 Feb 2024 06:22 )    Color: x / Appearance: x / SG: x / pH: x  Gluc: 140 mg/dL / Ketone: x  / Bili: x / Urobili: x   Blood: x / Protein: x / Nitrite: x   Leuk Esterase: x / RBC: x / WBC x   Sq Epi: x / Non Sq Epi: x / Bacteria: x    azithromycin  IVPB 500 milliGRAM(s) IV Intermittent every 24 hours  cefTRIAXone   IVPB 1000 milliGRAM(s) IV Intermittent every 24 hours      RADIOLOGY:  I have personally reviewed all chest and other pertinent radiology films.         REVIEW OF SYSTEMS:   see Hpi.    PHYSICAL EXAM:       · ENMT:   Airway patent,   Nasal mucosa clear.  Mouth with normal mucosa.   No thrush    · EYES:   Clear bilaterally,   pupils equal,   round and reactive to light.    · CARDIAC:   Normal rate,   regular rhythm.    Heart sounds S1, S2.   no thrills or bruits on palpitation  normal  cardiac impulse  No murmurs, no rubs or gallops on auscultation  no edema        CAROTID:   normal systolic impulse  no bruits    · RESPIRATORY:   no w/r/r/,   normal chest expansion  not tachypneic,  no retractions or use of accessory muscles  palpation of chest is normal with no fremitus  percussion of chest demonstrates no hyperresonance or dullness    · GASTROINTESTINAL:  Abdomen soft,   non-tender,   no guarding,   + BS  liver spleen not palpable      · MUSCULOSKELETAL:   range of motion is not limited,  no clubbing, cyanosis  no petechiae      · SKIN:   Skin normal color for race,   warm,   dry and intact.       · HEME LYMPH:   no splenomegaly.  No cervical  lymphadenopathy.  no inguinal lymphadenopathy

## 2024-02-14 LAB
ALBUMIN SERPL ELPH-MCNC: 3.8 G/DL — SIGNIFICANT CHANGE UP (ref 3.5–5.2)
ALP SERPL-CCNC: 70 U/L — SIGNIFICANT CHANGE UP (ref 30–115)
ALT FLD-CCNC: 57 U/L — HIGH (ref 0–41)
ANION GAP SERPL CALC-SCNC: 8 MMOL/L — SIGNIFICANT CHANGE UP (ref 7–14)
AST SERPL-CCNC: 74 U/L — HIGH (ref 0–41)
BASOPHILS # BLD AUTO: 0.02 K/UL — SIGNIFICANT CHANGE UP (ref 0–0.2)
BASOPHILS NFR BLD AUTO: 0.3 % — SIGNIFICANT CHANGE UP (ref 0–1)
BILIRUB SERPL-MCNC: <0.2 MG/DL — SIGNIFICANT CHANGE UP (ref 0.2–1.2)
BUN SERPL-MCNC: 13 MG/DL — SIGNIFICANT CHANGE UP (ref 10–20)
CALCIUM SERPL-MCNC: 8.6 MG/DL — SIGNIFICANT CHANGE UP (ref 8.4–10.5)
CHLORIDE SERPL-SCNC: 99 MMOL/L — SIGNIFICANT CHANGE UP (ref 98–110)
CO2 SERPL-SCNC: 31 MMOL/L — SIGNIFICANT CHANGE UP (ref 17–32)
CREAT SERPL-MCNC: 0.7 MG/DL — SIGNIFICANT CHANGE UP (ref 0.7–1.5)
EGFR: 103 ML/MIN/1.73M2 — SIGNIFICANT CHANGE UP
EOSINOPHIL # BLD AUTO: 0.17 K/UL — SIGNIFICANT CHANGE UP (ref 0–0.7)
EOSINOPHIL NFR BLD AUTO: 2.6 % — SIGNIFICANT CHANGE UP (ref 0–8)
GLUCOSE SERPL-MCNC: 81 MG/DL — SIGNIFICANT CHANGE UP (ref 70–99)
HCT VFR BLD CALC: 40.1 % — SIGNIFICANT CHANGE UP (ref 37–47)
HGB BLD-MCNC: 13.1 G/DL — SIGNIFICANT CHANGE UP (ref 12–16)
IMM GRANULOCYTES NFR BLD AUTO: 0.2 % — SIGNIFICANT CHANGE UP (ref 0.1–0.3)
LYMPHOCYTES # BLD AUTO: 2.46 K/UL — SIGNIFICANT CHANGE UP (ref 1.2–3.4)
LYMPHOCYTES # BLD AUTO: 38 % — SIGNIFICANT CHANGE UP (ref 20.5–51.1)
MCHC RBC-ENTMCNC: 29.4 PG — SIGNIFICANT CHANGE UP (ref 27–31)
MCHC RBC-ENTMCNC: 32.7 G/DL — SIGNIFICANT CHANGE UP (ref 32–37)
MCV RBC AUTO: 90.1 FL — SIGNIFICANT CHANGE UP (ref 81–99)
MONOCYTES # BLD AUTO: 0.7 K/UL — HIGH (ref 0.1–0.6)
MONOCYTES NFR BLD AUTO: 10.8 % — HIGH (ref 1.7–9.3)
NEUTROPHILS # BLD AUTO: 3.11 K/UL — SIGNIFICANT CHANGE UP (ref 1.4–6.5)
NEUTROPHILS NFR BLD AUTO: 48.1 % — SIGNIFICANT CHANGE UP (ref 42.2–75.2)
NRBC # BLD: 0 /100 WBCS — SIGNIFICANT CHANGE UP (ref 0–0)
PLATELET # BLD AUTO: 142 K/UL — SIGNIFICANT CHANGE UP (ref 130–400)
PMV BLD: 11.2 FL — HIGH (ref 7.4–10.4)
POTASSIUM SERPL-MCNC: 4.9 MMOL/L — SIGNIFICANT CHANGE UP (ref 3.5–5)
POTASSIUM SERPL-SCNC: 4.9 MMOL/L — SIGNIFICANT CHANGE UP (ref 3.5–5)
PROT SERPL-MCNC: 6.5 G/DL — SIGNIFICANT CHANGE UP (ref 6–8)
RBC # BLD: 4.45 M/UL — SIGNIFICANT CHANGE UP (ref 4.2–5.4)
RBC # FLD: 14.3 % — SIGNIFICANT CHANGE UP (ref 11.5–14.5)
SODIUM SERPL-SCNC: 138 MMOL/L — SIGNIFICANT CHANGE UP (ref 135–146)
WBC # BLD: 6.47 K/UL — SIGNIFICANT CHANGE UP (ref 4.8–10.8)
WBC # FLD AUTO: 6.47 K/UL — SIGNIFICANT CHANGE UP (ref 4.8–10.8)

## 2024-02-14 PROCEDURE — 99232 SBSQ HOSP IP/OBS MODERATE 35: CPT

## 2024-02-14 RX ORDER — SIMVASTATIN 20 MG/1
1 TABLET, FILM COATED ORAL
Refills: 0 | DISCHARGE

## 2024-02-14 RX ORDER — SIMVASTATIN 20 MG/1
40 TABLET, FILM COATED ORAL AT BEDTIME
Refills: 0 | Status: DISCONTINUED | OUTPATIENT
Start: 2024-02-14 | End: 2024-02-15

## 2024-02-14 RX ADMIN — AZITHROMYCIN 255 MILLIGRAM(S): 500 TABLET, FILM COATED ORAL at 17:27

## 2024-02-14 RX ADMIN — Medication 3 MILLILITER(S): at 08:19

## 2024-02-14 RX ADMIN — Medication 2 MILLIGRAM(S): at 11:27

## 2024-02-14 RX ADMIN — SIMVASTATIN 40 MILLIGRAM(S): 20 TABLET, FILM COATED ORAL at 23:46

## 2024-02-14 RX ADMIN — Medication 3 MILLILITER(S): at 16:34

## 2024-02-14 RX ADMIN — ONDANSETRON 4 MILLIGRAM(S): 8 TABLET, FILM COATED ORAL at 17:40

## 2024-02-14 RX ADMIN — CHLORHEXIDINE GLUCONATE 1 APPLICATION(S): 213 SOLUTION TOPICAL at 05:32

## 2024-02-14 RX ADMIN — Medication 2 MILLIGRAM(S): at 23:46

## 2024-02-14 RX ADMIN — LISINOPRIL 10 MILLIGRAM(S): 2.5 TABLET ORAL at 05:32

## 2024-02-14 RX ADMIN — ENOXAPARIN SODIUM 40 MILLIGRAM(S): 100 INJECTION SUBCUTANEOUS at 21:07

## 2024-02-14 RX ADMIN — Medication 2 MILLIGRAM(S): at 17:40

## 2024-02-14 RX ADMIN — CEFTRIAXONE 100 MILLIGRAM(S): 500 INJECTION, POWDER, FOR SOLUTION INTRAMUSCULAR; INTRAVENOUS at 17:26

## 2024-02-14 RX ADMIN — Medication 2 MILLIGRAM(S): at 01:41

## 2024-02-14 RX ADMIN — Medication 3 MILLILITER(S): at 13:24

## 2024-02-14 NOTE — PROGRESS NOTE ADULT - SUBJECTIVE AND OBJECTIVE BOX
53F w/PMHx of HTN, Opiate Dependence on MMTP, ISSA presents to ED with her  with complaints of SOB, admitted to medicine service for further management    Today:  Seen at bedside, no new complaints.  Says her breathing is much better.              REVIEW OF SYSTEMS:  No new complaints    MEDICATIONS  (STANDING):  albuterol/ipratropium for Nebulization 3 milliLiter(s) Nebulizer every 4 hours  albuterol/ipratropium for Nebulization.. 3 milliLiter(s) Nebulizer every 20 minutes  azithromycin  IVPB 500 milliGRAM(s) IV Intermittent every 24 hours  cefTRIAXone   IVPB 1000 milliGRAM(s) IV Intermittent every 24 hours  chlorhexidine 2% Cloths 1 Application(s) Topical <User Schedule>  enoxaparin Injectable 40 milliGRAM(s) SubCutaneous every 24 hours  influenza   Vaccine 0.5 milliLiter(s) IntraMuscular once  lisinopril 10 milliGRAM(s) Oral daily  methadone    Tablet 120 milliGRAM(s) Oral daily    MEDICATIONS  (PRN):  acetaminophen     Tablet .. 650 milliGRAM(s) Oral every 6 hours PRN Temp greater or equal to 38C (100.4F), Mild Pain (1 - 3)  albuterol/ipratropium for Nebulization 3 milliLiter(s) Nebulizer every 4 hours PRN Bronchospasm  ALPRAZolam 2 milliGRAM(s) Oral three times a day PRN for anxiety  aluminum hydroxide/magnesium hydroxide/simethicone Suspension 30 milliLiter(s) Oral every 4 hours PRN Dyspepsia  guaifenesin/dextromethorphan Oral Liquid 10 milliLiter(s) Oral every 6 hours PRN Cough  melatonin 3 milliGRAM(s) Oral at bedtime PRN Insomnia  ondansetron Injectable 4 milliGRAM(s) IV Push every 8 hours PRN Nausea and/or Vomiting      Allergies  No Known Allergies        Vital Signs Last 24 Hrs  T(C): 35.8 (13 Feb 2024 13:13), Max: 36.3 (13 Feb 2024 05:39)  T(F): 96.4 (13 Feb 2024 13:13), Max: 97.4 (13 Feb 2024 05:39)  HR: 89 (13 Feb 2024 13:13) (74 - 93)  BP: 110/57 (13 Feb 2024 13:13) (110/57 - 142/87)  RR: 18 (13 Feb 2024 13:13) (18 - 18)  SpO2: 96% (13 Feb 2024 05:39) (96% - 96%)    Parameters below as of 12 Feb 2024 21:05  Patient On (Oxygen Delivery Method): nasal cannula  O2 Flow (L/min): 3      PHYSICAL EXAM:  GENERAL: NAD, well-developed  SKIN: No rashes or lesions  HEAD:  Atraumatic, Normocephalic  EYES: EOMI, PERRLA, conjunctiva and sclera clear  NECK: Supple, No JVD  CHEST/LUNG: + wheeze b/l  HEART: Regular rate and rhythm; No murmurs, rubs, or gallops  ABDOMEN: Soft, Nontender, Nondistended; Bowel sounds present  EXTREMITIES:  No clubbing, cyanosis. 1+ BLE pitting edema  CNS: AAOx3      LABS:                        12.9   8.83  )-----------( 132      ( 13 Feb 2024 07:55 )             39.9     02-13    138  |  101  |  20  ----------------------------<  114<H>  4.5   |  30  |  0.7    Ca    8.3<L>      13 Feb 2024 07:55    TPro  6.6  /  Alb  3.6  /  TBili  0.2  /  DBili  x   /  AST  122<H>  /  ALT  55<H>  /  AlkPhos  67  02-13      Urinalysis Basic - ( 13 Feb 2024 07:55 )    Color: x / Appearance: x / SG: x / pH: x  Gluc: 114 mg/dL / Ketone: x  / Bili: x / Urobili: x   Blood: x / Protein: x / Nitrite: x   Leuk Esterase: x / RBC: x / WBC x   Sq Epi: x / Non Sq Epi: x / Bacteria: x      
53F w/PMHx of HTN, Opiate Dependence on MMTP, ISSA presents to ED with her  with complaints of SOB, admitted to medicine service for further management    Today:  Seen at bedside, states she feels better, no longer with upper airway secretions.            REVIEW OF SYSTEMS:  No new complaints      MEDICATIONS  (STANDING):  albuterol/ipratropium for Nebulization 3 milliLiter(s) Nebulizer every 4 hours  albuterol/ipratropium for Nebulization.. 3 milliLiter(s) Nebulizer every 20 minutes  azithromycin  IVPB 500 milliGRAM(s) IV Intermittent every 24 hours  cefTRIAXone   IVPB 1000 milliGRAM(s) IV Intermittent every 24 hours  chlorhexidine 2% Cloths 1 Application(s) Topical <User Schedule>  enoxaparin Injectable 40 milliGRAM(s) SubCutaneous every 24 hours  influenza   Vaccine 0.5 milliLiter(s) IntraMuscular once  lisinopril 10 milliGRAM(s) Oral daily  methadone    Tablet 120 milliGRAM(s) Oral daily  sodium zirconium cyclosilicate 10 Gram(s) Oral once    MEDICATIONS  (PRN):  acetaminophen     Tablet .. 650 milliGRAM(s) Oral every 6 hours PRN Temp greater or equal to 38C (100.4F), Mild Pain (1 - 3)  albuterol/ipratropium for Nebulization 3 milliLiter(s) Nebulizer every 4 hours PRN Bronchospasm  ALPRAZolam 2 milliGRAM(s) Oral three times a day PRN for anxiety  aluminum hydroxide/magnesium hydroxide/simethicone Suspension 30 milliLiter(s) Oral every 4 hours PRN Dyspepsia  guaifenesin/dextromethorphan Oral Liquid 10 milliLiter(s) Oral every 6 hours PRN Cough  melatonin 3 milliGRAM(s) Oral at bedtime PRN Insomnia  ondansetron Injectable 4 milliGRAM(s) IV Push every 8 hours PRN Nausea and/or Vomiting      Allergies  No Known Allergies          Vital Signs Last 24 Hrs  T(C): 36.1 (12 Feb 2024 13:56), Max: 36.1 (12 Feb 2024 13:56)  T(F): 96.9 (12 Feb 2024 13:56), Max: 96.9 (12 Feb 2024 13:56)  HR: 93 (12 Feb 2024 13:56) (92 - 97)  BP: 111/80 (12 Feb 2024 13:56) (97/76 - 126/77)  RR: 18 (12 Feb 2024 13:56) (18 - 18)  SpO2: 92% (12 Feb 2024 04:55) (92% - 96%)    Parameters below as of 12 Feb 2024 04:55  Patient On (Oxygen Delivery Method): nasal cannula  O2 Flow (L/min): 4      PHYSICAL EXAM:  GENERAL: NAD, well-developed  SKIN: No rashes or lesions  HEAD:  Atraumatic, Normocephalic  EYES: EOMI, PERRLA, conjunctiva and sclera clear  NECK: Supple, No JVD  CHEST/LUNG: + wheeze b/l  HEART: Regular rate and rhythm; No murmurs, rubs, or gallops  ABDOMEN: Soft, Nontender, Nondistended; Bowel sounds present  EXTREMITIES:  No clubbing, cyanosis. 1+ BLE pitting edema  CNS: AAOx3      LABS:                        12.6   9.96  )-----------( 134      ( 12 Feb 2024 06:22 )             39.9     02-12    137  |  99  |  24<H>  ----------------------------<  140<H>  5.4<H>   |  26  |  1.1    Ca    8.9      12 Feb 2024 06:22    TPro  7.2  /  Alb  3.9  /  TBili  0.4  /  DBili  x   /  AST  71<H>  /  ALT  45<H>  /  AlkPhos  72  02-12      Urinalysis Basic - ( 12 Feb 2024 06:22 )    Color: x / Appearance: x / SG: x / pH: x  Gluc: 140 mg/dL / Ketone: x  / Bili: x / Urobili: x   Blood: x / Protein: x / Nitrite: x   Leuk Esterase: x / RBC: x / WBC x   Sq Epi: x / Non Sq Epi: x / Bacteria: x        
Patient is a 53y old  Female who presents with a chief complaint of SOB and weakness x 1 week      MEDICATIONS  (STANDING):  albuterol/ipratropium for Nebulization 3 milliLiter(s) Nebulizer every 4 hours  albuterol/ipratropium for Nebulization.. 3 milliLiter(s) Nebulizer every 20 minutes  azithromycin  IVPB 500 milliGRAM(s) IV Intermittent every 24 hours  cefTRIAXone   IVPB 1000 milliGRAM(s) IV Intermittent every 24 hours  chlorhexidine 2% Cloths 1 Application(s) Topical <User Schedule>  enoxaparin Injectable 40 milliGRAM(s) SubCutaneous every 24 hours  influenza   Vaccine 0.5 milliLiter(s) IntraMuscular once  lisinopril 10 milliGRAM(s) Oral daily  methadone    Tablet 120 milliGRAM(s) Oral daily    MEDICATIONS  (PRN):  acetaminophen     Tablet .. 650 milliGRAM(s) Oral every 6 hours PRN Temp greater or equal to 38C (100.4F), Mild Pain (1 - 3)  albuterol/ipratropium for Nebulization 3 milliLiter(s) Nebulizer every 4 hours PRN Bronchospasm  ALPRAZolam 2 milliGRAM(s) Oral three times a day PRN for anxiety  aluminum hydroxide/magnesium hydroxide/simethicone Suspension 30 milliLiter(s) Oral every 4 hours PRN Dyspepsia  guaifenesin/dextromethorphan Oral Liquid 10 milliLiter(s) Oral every 6 hours PRN Cough  melatonin 3 milliGRAM(s) Oral at bedtime PRN Insomnia  ondansetron Injectable 4 milliGRAM(s) IV Push every 8 hours PRN Nausea and/or Vomiting      CAPILLARY BLOOD GLUCOSE  I&O's Summary      PHYSICAL EXAM:  Vital Signs Last 24 Hrs  T(C): 35.6 (14 Feb 2024 04:55), Max: 36.1 (13 Feb 2024 21:05)  T(F): 96.1 (14 Feb 2024 04:55), Max: 97 (13 Feb 2024 21:05)  HR: 99 (14 Feb 2024 10:11) (81 - 99)  BP: 134/74 (14 Feb 2024 10:11) (114/62 - 134/74)  BP(mean): --  RR: 18 (14 Feb 2024 10:11) (18 - 18)  SpO2: 94% (14 Feb 2024 10:11) (93% - 94%)    Parameters below as of 14 Feb 2024 10:11  Patient On (Oxygen Delivery Method): room air      GENERAL: No acute distress, well-developed  HEAD:  Atraumatic, Normocephalic  EYES: EOMI, PERRLA, conjunctiva and sclera clear  NECK: Supple, no lymphadenopathy, no JVD  CHEST/LUNG: CTAB; No wheezes, rales, or rhonchi  HEART: Regular rate and rhythm; No murmurs, rubs, or gallops  ABDOMEN: Soft, non-tender, non-distended; normal bowel sounds,   EXTREMITIES:  2+ peripheral pulses b/l, No clubbing, cyanosis, or edema  NEUROLOGY: A&O x 3, no focal deficits  SKIN: No rashes or lesions    LABS:                        13.1   6.47  )-----------( 142      ( 14 Feb 2024 08:05 )             40.1     02-14    138  |  99  |  13  ----------------------------<  81  4.9   |  31  |  0.7    Ca    8.6      14 Feb 2024 08:05    TPro  6.5  /  Alb  3.8  /  TBili  <0.2  /  DBili  x   /  AST  74<H>  /  ALT  57<H>  /  AlkPhos  70  02-14      Urinalysis Basic - ( 14 Feb 2024 08:05 )    Color: x / Appearance: x / SG: x / pH: x  Gluc: 81 mg/dL / Ketone: x  / Bili: x / Urobili: x   Blood: x / Protein: x / Nitrite: x   Leuk Esterase: x / RBC: x / WBC x   Sq Epi: x / Non Sq Epi: x / Bacteria: x

## 2024-02-14 NOTE — PROGRESS NOTE ADULT - ASSESSMENT
53F w/PMHx of HTN, Opiate Dependence on MMTP, ISSA presents to ED with her  with complaints of SOB, admitted to medicine service for further management    #SOB, unclear etiology-malignancy?  - c/w CTX and Azithromycin  - b2 q4h ATC while awake and PRN  - Robitussin DM PRN  - CTA PE protocol:  IMPRESSION:  Bilateral lower lobe opacities and pulmonary nodules which may be   secondary to infectious/inflammatory etiologies. Neoplastic process is   not excluded.  Nonspecific pretracheal 1.7 cm lymph node and other prominent mediastinal   lymph nodes. Additionally, and large periaortic and portacaval lymph   nodes as above, nonspecific. Follow-up per oncologic protocol.  No central pulmonary emboli or right heart strain.  - Procal neg  - check full RVP  - TTE unremarkable  - lactate trended to normal    #Elevated Troponin  - 38 -> 32, no further need to trend    #Opiate Dependence  - on Methadone 120mg QD  - [facility] [phone]    #HTN  - c/w Lisinopril  - DASH Diet    #ISSA  - Alprazolam PRN per home schedule  - [facility]top Reference #: [id number]    Diet: DASH  Activity: OOB  DVT PPX: Loveonox  Code status: Full  Dispo: Home  CHG 2% Wipes QD     
53F w/PMHx of HTN, Opiate Dependence on MMTP, ISSA presents to ED with her  with complaints of SOB, admitted to medicine service for further management    #SOB, unclear etiology-malignancy?  - c/w CTX and Azithromycin  - b2 q4h ATC while awake and PRN  - Robitussin DM PRN  - CTA PE protocol:  IMPRESSION:  Bilateral lower lobe opacities and pulmonary nodules which may be   secondary to infectious/inflammatory etiologies. Neoplastic process is   not excluded.  Nonspecific pretracheal 1.7 cm lymph node and other prominent mediastinal   lymph nodes. Additionally, and large periaortic and portacaval lymph   nodes as above, nonspecific. Follow-up per oncologic protocol.  No central pulmonary emboli or right heart strain.  - Procal neg  - check full RVP  - TTE unremarkable  - lactate trended to normal    #Elevated Troponin  - 38 -> 32, no further need to trend    #Opiate Dependence  - on Methadone 120mg QD  - [facility] [phone]    #HTN  - c/w Lisinopril  - DASH Diet    #ISSA  - Alprazolam PRN per home schedule  - [facility]top Reference #: [id number]    Diet: DASH  Activity: OOB  DVT PPX: Loveonox  Code status: Full  Dispo: Home  CHG 2% Wipes QD     Acute, possible DC tomorrow on PO abx.  Will need repeat chest CT 2-3 months.  
53F w/PMHx of HTN, Opiate Dependence on MMTP, ISSA presents to ED with her  with complaints of SOB, admitted to medicine service for further management    Shortness of  Breath: Multilobar Pneumonia v.s malignancy Improved   20 Pack year Tobacco  Smoker, Quit 2yrs ago   -States she is has regular follow-ups with  mammograms last PAP 2021, no colonoscopy   -CTA PE protocol: Ruled out PE   Bilateral lower lobe opacities and pulmonary nodules which may be   secondary to infectious/inflammatory etiologies.   Neoplastic process is not excluded.  Nonspecific pretracheal 1.7 cm lymph node and other prominent mediastinal   lymph nodes. Additionally, and large periaortic and portacaval lymph   nodes as above, nonspecific. Follow-up per oncologic protocol.  No central pulmonary emboli or right heart strain.  TTE unremarkable  Procalcitonin Negative, c/w antitussive, Duonebs and complete antibiotics regimen   Patient Counselled about the possibility of malignancy and needs Repeat CT chest in 6-8 weeks   Anticipate D/C in AM     Elevated Troponin  - 38 -> 32, no further need to trend    Opiate Dependence  - on Methadone 120mg QD  - [facility] [phone]    Hypertension: C/w ACEi and  DASH Diet    Generalized Anxiety Disorder  - Alprazolam PRN per home schedule  - [facility] Reference #: [id number]    DVT PPX: Lovenox  GI PPX: Feeding   Full Code   Dispo: from Home   Anticipate D/C in AM

## 2024-02-14 NOTE — CHART NOTE - NSCHARTNOTEFT_GEN_A_CORE
Patient provided a bag of her meds which, in addition to meds already listed on reconciliation documented, also contained Zocor (I added) and Estrogen patch (which I will order as non formulary)

## 2024-02-15 ENCOUNTER — TRANSCRIPTION ENCOUNTER (OUTPATIENT)
Age: 54
End: 2024-02-15

## 2024-02-15 VITALS
OXYGEN SATURATION: 100 % | SYSTOLIC BLOOD PRESSURE: 141 MMHG | HEART RATE: 80 BPM | DIASTOLIC BLOOD PRESSURE: 81 MMHG | TEMPERATURE: 98 F | RESPIRATION RATE: 18 BRPM

## 2024-02-15 PROCEDURE — 99239 HOSP IP/OBS DSCHRG MGMT >30: CPT

## 2024-02-15 RX ORDER — GUAIFENESIN/DEXTROMETHORPHAN 600MG-30MG
15 TABLET, EXTENDED RELEASE 12 HR ORAL
Qty: 135 | Refills: 0
Start: 2024-02-15 | End: 2024-02-17

## 2024-02-15 RX ORDER — CEFPODOXIME PROXETIL 100 MG
1 TABLET ORAL
Qty: 6 | Refills: 0
Start: 2024-02-15 | End: 2024-02-17

## 2024-02-15 RX ORDER — SACCHAROMYCES BOULARDII 250 MG
1 POWDER IN PACKET (EA) ORAL
Qty: 240 | Refills: 0
Start: 2024-02-15 | End: 2024-06-13

## 2024-02-15 RX ADMIN — CHLORHEXIDINE GLUCONATE 1 APPLICATION(S): 213 SOLUTION TOPICAL at 05:41

## 2024-02-15 RX ADMIN — Medication 2 MILLIGRAM(S): at 11:22

## 2024-02-15 RX ADMIN — LISINOPRIL 10 MILLIGRAM(S): 2.5 TABLET ORAL at 05:23

## 2024-02-15 RX ADMIN — Medication 2 MILLIGRAM(S): at 05:26

## 2024-02-15 RX ADMIN — METHADONE HYDROCHLORIDE 120 MILLIGRAM(S): 40 TABLET ORAL at 10:15

## 2024-02-15 RX ADMIN — Medication 1 PATCH: at 10:20

## 2024-02-15 NOTE — DISCHARGE NOTE PROVIDER - CARE PROVIDER_API CALL
Leta Wells  Internal Medicine  [address] [place] [address]  Phone: [phone]  Fax: [phone]  Follow Up Time:     Cuate Bradley Fernando  Pulmonary Disease  [address] [place] [id number]  Phone: [phone]  Fax: [phone]  Follow Up Time:

## 2024-02-15 NOTE — DISCHARGE NOTE PROVIDER - NSDCCPCAREPLAN_GEN_ALL_CORE_FT
PRINCIPAL DISCHARGE DIAGNOSIS  Diagnosis: Shortness of breath  Assessment and Plan of Treatment: Possibly from Pneumonia however malignacy could not be ruled out   Your CT chest showed Bilateral lower lobe opacities and pulmonary nodules which may be secondary to infectious/inflammatory etiologies.   Neoplastic process is not excluded.  Nonspecific pretracheal 1.7 cm lymph node and other prominent mediastinal lymph nodes. Additionally, and large periaortic and portacaval lymph nodes as above, nonspecific. Follow-up per oncologic protocol.  No central pulmonary emboli or right heart strain.   Ultrasound of your Heart was fine  You Need  to Follow-up with Pulmonologist for Repeat CT Chest in 6-8 weeks      SECONDARY DISCHARGE DIAGNOSES  Diagnosis: Pulmonary nodules  Assessment and Plan of Treatment: You Need  to Follow-up with Pulmonologist for Repeat CT Chest in 6-8 weeks    Diagnosis: Pneumonia  Assessment and Plan of Treatment: Complete your antibiotics as prescribed and follow-up with your primary doctor and pulmonologist

## 2024-02-15 NOTE — DISCHARGE NOTE PROVIDER - NSDCMRMEDTOKEN_GEN_ALL_CORE_FT
ALPRAZolam 2 mg oral tablet: 1 tab(s) orally 3 times a day as needed for  anxiety  benzonatate 100 mg oral capsule: 1 cap(s) orally every 8 hours  cefpodoxime 200 mg oral tablet: 1 tab(s) orally 2 times a day Start 2/16  estradiol 0.025 mg/24 hours twice weekly transdermal film, extended release: 1 patch transdermally 2 times a week  guaiFENesin-dextromethorphan 100 mg-10 mg/5 mL oral liquid: 15 milliliter(s) orally every 8 hours as needed for Cough  lisinopril 10 mg oral tablet: 1 tab(s) orally once a day  methadone 10 mg oral tablet: 120 tab(s) orally once a day  saccharomyces boulardii lyo 250 mg oral capsule: 1 cap(s) orally 2 times a day  simvastatin 40 mg oral tablet: 1 tab(s) orally once a day (at bedtime)

## 2024-02-15 NOTE — DISCHARGE NOTE NURSING/CASE MANAGEMENT/SOCIAL WORK - PATIENT PORTAL LINK FT
You can access the FollowMyHealth Patient Portal offered by [facility] by registering at the following website: [email or web address]. By joining [employer]’s FollowMyHealth portal, you will also be able to view your health information using other applications (apps) compatible with our system.

## 2024-02-15 NOTE — DISCHARGE NOTE PROVIDER - ATTENDING DISCHARGE PHYSICAL EXAMINATION:
GENERAL: No acute distress, well-developed  HEAD:  Atraumatic, Normocephalic  EYES: EOMI, PERRLA, conjunctiva and sclera clear  NECK: Supple, no lymphadenopathy, no JVD  CHEST/LUNG: CTAB; No wheezes, rales, or rhonchi  HEART: Regular rate and rhythm; No murmurs, rubs, or gallops  ABDOMEN: Soft, non-tender, non-distended; normal bowel sounds,   EXTREMITIES:  2+ peripheral pulses b/l, No clubbing, cyanosis, or edema  NEUROLOGY: A&O x 3, no focal deficits  SKIN: No rashes or lesions

## 2024-02-15 NOTE — DISCHARGE NOTE NURSING/CASE MANAGEMENT/SOCIAL WORK - NSDCPEFALRISK_GEN_ALL_CORE
For information on Fall & Injury Prevention, visit: [email or web address] OR  [email or web address] OR  https://www.cdc.gov/steadi/patient.html

## 2024-02-15 NOTE — DISCHARGE NOTE PROVIDER - CARE PROVIDERS DIRECT ADDRESSES
[email or web address]

## 2024-02-15 NOTE — DISCHARGE NOTE PROVIDER - HOSPITAL COURSE
53F w/PMHx of HTN, Opiate Dependence on MMTP, ISSA presents to ED with her  with complaints of SOB, admitted to medicine service for further management    Shortness of  Breath: Multilobar Pneumonia v.s malignancy Improved   20 Pack year Tobacco  Smoker, Quit 2yrs ago   -States she is has regular follow-ups with  mammograms last PAP 2021, no colonoscopy   -CTA PE protocol: Ruled out PE   Bilateral lower lobe opacities and pulmonary nodules which may be   secondary to infectious/inflammatory etiologies.   Neoplastic process is not excluded.  Nonspecific pretracheal 1.7 cm lymph node and other prominent mediastinal   lymph nodes. Additionally, and large periaortic and portacaval lymph   nodes as above, nonspecific. Follow-up per oncologic protocol.  No central pulmonary emboli or right heart strain.  TTE unremarkable  Procalcitonin Negative, c/w antitussive, Duonebs and complete antibiotics regimen   Patient Counselled about the possibility of malignancy and needs Repeat CT chest in 6-8 weeks   Anticipate D/C in AM     Elevated Troponin  - 38 -> 32, no further need to trend    Opiate Dependence

## 2024-05-07 ENCOUNTER — INPATIENT (INPATIENT)
Facility: HOSPITAL | Age: 54
LOS: 2 days | Discharge: HOME | DRG: 133 | End: 2024-05-10
Attending: INTERNAL MEDICINE | Admitting: INTERNAL MEDICINE
Payer: MEDICAID

## 2024-05-07 VITALS
TEMPERATURE: 98 F | SYSTOLIC BLOOD PRESSURE: 126 MMHG | RESPIRATION RATE: 18 BRPM | HEART RATE: 130 BPM | DIASTOLIC BLOOD PRESSURE: 56 MMHG | OXYGEN SATURATION: 94 % | WEIGHT: 266.98 LBS

## 2024-05-07 DIAGNOSIS — Z98.51 TUBAL LIGATION STATUS: Chronic | ICD-10-CM

## 2024-05-07 LAB
ALBUMIN SERPL ELPH-MCNC: 4.1 G/DL — SIGNIFICANT CHANGE UP (ref 3.5–5.2)
ALP SERPL-CCNC: 77 U/L — SIGNIFICANT CHANGE UP (ref 30–115)
ALT FLD-CCNC: 34 U/L — SIGNIFICANT CHANGE UP (ref 0–41)
ANION GAP SERPL CALC-SCNC: 12 MMOL/L — SIGNIFICANT CHANGE UP (ref 7–14)
APTT BLD: 30.1 SEC — SIGNIFICANT CHANGE UP (ref 27–39.2)
AST SERPL-CCNC: 39 U/L — SIGNIFICANT CHANGE UP (ref 0–41)
BASE EXCESS BLDV CALC-SCNC: 3.1 MMOL/L — HIGH (ref -2–3)
BASOPHILS # BLD AUTO: 0.03 K/UL — SIGNIFICANT CHANGE UP (ref 0–0.2)
BASOPHILS NFR BLD AUTO: 0.2 % — SIGNIFICANT CHANGE UP (ref 0–1)
BILIRUB SERPL-MCNC: 0.4 MG/DL — SIGNIFICANT CHANGE UP (ref 0.2–1.2)
BUN SERPL-MCNC: 7 MG/DL — LOW (ref 10–20)
CA-I SERPL-SCNC: 1.19 MMOL/L — SIGNIFICANT CHANGE UP (ref 1.15–1.33)
CALCIUM SERPL-MCNC: 9 MG/DL — SIGNIFICANT CHANGE UP (ref 8.4–10.4)
CHLORIDE SERPL-SCNC: 100 MMOL/L — SIGNIFICANT CHANGE UP (ref 98–110)
CO2 SERPL-SCNC: 26 MMOL/L — SIGNIFICANT CHANGE UP (ref 17–32)
CREAT SERPL-MCNC: 1 MG/DL — SIGNIFICANT CHANGE UP (ref 0.7–1.5)
EGFR: 67 ML/MIN/1.73M2 — SIGNIFICANT CHANGE UP
EOSINOPHIL # BLD AUTO: 0.01 K/UL — SIGNIFICANT CHANGE UP (ref 0–0.7)
EOSINOPHIL NFR BLD AUTO: 0.1 % — SIGNIFICANT CHANGE UP (ref 0–8)
FLUAV AG NPH QL: SIGNIFICANT CHANGE UP
FLUBV AG NPH QL: SIGNIFICANT CHANGE UP
GAS PNL BLDV: 133 MMOL/L — LOW (ref 136–145)
GAS PNL BLDV: SIGNIFICANT CHANGE UP
GAS PNL BLDV: SIGNIFICANT CHANGE UP
GLUCOSE SERPL-MCNC: 96 MG/DL — SIGNIFICANT CHANGE UP (ref 70–99)
HCO3 BLDV-SCNC: 30 MMOL/L — HIGH (ref 22–29)
HCT VFR BLD CALC: 47 % — SIGNIFICANT CHANGE UP (ref 37–47)
HCT VFR BLDA CALC: 45 % — SIGNIFICANT CHANGE UP (ref 34.5–46.5)
HGB BLD CALC-MCNC: 15 G/DL — SIGNIFICANT CHANGE UP (ref 11.7–16.1)
HGB BLD-MCNC: 15 G/DL — SIGNIFICANT CHANGE UP (ref 12–16)
IMM GRANULOCYTES NFR BLD AUTO: 0.4 % — HIGH (ref 0.1–0.3)
INR BLD: 1.15 RATIO — SIGNIFICANT CHANGE UP (ref 0.65–1.3)
LACTATE BLDV-MCNC: 2.9 MMOL/L — HIGH (ref 0.5–2)
LYMPHOCYTES # BLD AUTO: 0.7 K/UL — LOW (ref 1.2–3.4)
LYMPHOCYTES # BLD AUTO: 5.8 % — LOW (ref 20.5–51.1)
MCHC RBC-ENTMCNC: 29 PG — SIGNIFICANT CHANGE UP (ref 27–31)
MCHC RBC-ENTMCNC: 31.9 G/DL — LOW (ref 32–37)
MCV RBC AUTO: 90.9 FL — SIGNIFICANT CHANGE UP (ref 81–99)
MONOCYTES # BLD AUTO: 0.79 K/UL — HIGH (ref 0.1–0.6)
MONOCYTES NFR BLD AUTO: 6.5 % — SIGNIFICANT CHANGE UP (ref 1.7–9.3)
NEUTROPHILS # BLD AUTO: 10.53 K/UL — HIGH (ref 1.4–6.5)
NEUTROPHILS NFR BLD AUTO: 87 % — HIGH (ref 42.2–75.2)
NRBC # BLD: 0 /100 WBCS — SIGNIFICANT CHANGE UP (ref 0–0)
NT-PROBNP SERPL-SCNC: 99 PG/ML — SIGNIFICANT CHANGE UP (ref 0–300)
PCO2 BLDV: 53 MMHG — HIGH (ref 39–42)
PH BLDV: 7.36 — SIGNIFICANT CHANGE UP (ref 7.32–7.43)
PLATELET # BLD AUTO: 141 K/UL — SIGNIFICANT CHANGE UP (ref 130–400)
PMV BLD: 10.8 FL — HIGH (ref 7.4–10.4)
PO2 BLDV: 41 MMHG — SIGNIFICANT CHANGE UP (ref 25–45)
POTASSIUM BLDV-SCNC: 4 MMOL/L — SIGNIFICANT CHANGE UP (ref 3.5–5.1)
POTASSIUM SERPL-MCNC: 4.2 MMOL/L — SIGNIFICANT CHANGE UP (ref 3.5–5)
POTASSIUM SERPL-SCNC: 4.2 MMOL/L — SIGNIFICANT CHANGE UP (ref 3.5–5)
PROT SERPL-MCNC: 6.8 G/DL — SIGNIFICANT CHANGE UP (ref 6–8)
PROTHROM AB SERPL-ACNC: 13.1 SEC — HIGH (ref 9.95–12.87)
RBC # BLD: 5.17 M/UL — SIGNIFICANT CHANGE UP (ref 4.2–5.4)
RBC # FLD: 13.2 % — SIGNIFICANT CHANGE UP (ref 11.5–14.5)
RSV RNA NPH QL NAA+NON-PROBE: SIGNIFICANT CHANGE UP
SAO2 % BLDV: 71.9 % — SIGNIFICANT CHANGE UP (ref 67–88)
SARS-COV-2 RNA SPEC QL NAA+PROBE: SIGNIFICANT CHANGE UP
SODIUM SERPL-SCNC: 138 MMOL/L — SIGNIFICANT CHANGE UP (ref 135–146)
TROPONIN T, HIGH SENSITIVITY RESULT: 118 NG/L — CRITICAL HIGH (ref 6–13)
WBC # BLD: 12.11 K/UL — HIGH (ref 4.8–10.8)
WBC # FLD AUTO: 12.11 K/UL — HIGH (ref 4.8–10.8)

## 2024-05-07 PROCEDURE — 71275 CT ANGIOGRAPHY CHEST: CPT | Mod: 26,MC

## 2024-05-07 PROCEDURE — 99285 EMERGENCY DEPT VISIT HI MDM: CPT

## 2024-05-07 PROCEDURE — 71045 X-RAY EXAM CHEST 1 VIEW: CPT | Mod: 26

## 2024-05-07 RX ORDER — AZITHROMYCIN 500 MG/1
500 TABLET, FILM COATED ORAL ONCE
Refills: 0 | Status: COMPLETED | OUTPATIENT
Start: 2024-05-07 | End: 2024-05-07

## 2024-05-07 RX ORDER — CEFTRIAXONE 500 MG/1
1000 INJECTION, POWDER, FOR SOLUTION INTRAMUSCULAR; INTRAVENOUS ONCE
Refills: 0 | Status: COMPLETED | OUTPATIENT
Start: 2024-05-07 | End: 2024-05-07

## 2024-05-07 RX ORDER — ASPIRIN/CALCIUM CARB/MAGNESIUM 324 MG
182 TABLET ORAL ONCE
Refills: 0 | Status: DISCONTINUED | OUTPATIENT
Start: 2024-05-07 | End: 2024-05-07

## 2024-05-07 RX ORDER — SODIUM CHLORIDE 9 MG/ML
2100 INJECTION, SOLUTION INTRAVENOUS ONCE
Refills: 0 | Status: COMPLETED | OUTPATIENT
Start: 2024-05-07 | End: 2024-05-07

## 2024-05-07 RX ORDER — IPRATROPIUM/ALBUTEROL SULFATE 18-103MCG
3 AEROSOL WITH ADAPTER (GRAM) INHALATION ONCE
Refills: 0 | Status: COMPLETED | OUTPATIENT
Start: 2024-05-07 | End: 2024-05-07

## 2024-05-07 RX ORDER — ASPIRIN/CALCIUM CARB/MAGNESIUM 324 MG
162 TABLET ORAL ONCE
Refills: 0 | Status: COMPLETED | OUTPATIENT
Start: 2024-05-07 | End: 2024-05-07

## 2024-05-07 RX ADMIN — SODIUM CHLORIDE 2100 MILLILITER(S): 9 INJECTION, SOLUTION INTRAVENOUS at 23:43

## 2024-05-07 RX ADMIN — CEFTRIAXONE 100 MILLIGRAM(S): 500 INJECTION, POWDER, FOR SOLUTION INTRAMUSCULAR; INTRAVENOUS at 22:46

## 2024-05-07 RX ADMIN — AZITHROMYCIN 255 MILLIGRAM(S): 500 TABLET, FILM COATED ORAL at 22:46

## 2024-05-07 RX ADMIN — Medication 162 MILLIGRAM(S): at 23:43

## 2024-05-07 NOTE — ED PROVIDER NOTE - PHYSICAL EXAMINATION
CONSTITUTIONAL: Well-appearing; in no apparent distress.   EYES: PERRL; EOM intact.   CARDIOVASCULAR: Normal S1, S2; no murmurs, rubs, or gallops.   RESPIRATORY: Respiratory rate 22 with mild accessory muscle use.  Rhonchi noted to left lung.  Right lung clear.  No wheezing.    GI/: Normal bowel sounds; non-distended; non-tender; no palpable organomegaly.   MS: No calf swelling and tenderness.  No pitting edema  SKIN: Cool and diaphoretic.   NEURO/PSYCH: A & O x 4; grossly unremarkable.

## 2024-05-07 NOTE — ED ADULT NURSE NOTE - NSFALLUNIVINTERV_ED_ALL_ED
Monitor gait and stability/Monitor for mental status changes and reorient to person, place, and time, as needed/Bed/Stretcher in lowest position, wheels locked, appropriate side rails in place/Call bell, personal items and telephone in reach/Instruct patient to call for assistance before getting out of bed/chair/stretcher/Non-slip footwear applied when patient is off stretcher/[place] to call system/Physically safe environment - no spills, clutter or unnecessary equipment/Purposeful proactive rounding/Room/bathroom lighting operational, light cord in reach

## 2024-05-07 NOTE — ED ADULT TRIAGE NOTE - CHIEF COMPLAINT QUOTE
BIBA from home with complaints of shortness of breath. Patient found to be 80% on RA and improved with supplemental O2.

## 2024-05-07 NOTE — ED PROVIDER NOTE - CLINICAL SUMMARY MEDICAL DECISION MAKING FREE TEXT BOX
53yF p/w dyspnea and hypoxia - no fever or sig cough.  EKG w/o new ischemia/arrhythmia.  CXR w/o ptx or pna.  Labs w/ markedly elevated troponin >110 w/ normal renal function and BNP.  CTA chest w/o PE but with L sided PNA.  Pt treated with IV abx and aspirin and will adm for further care.

## 2024-05-07 NOTE — ED PROVIDER NOTE - OBJECTIVE STATEMENT
53 years old female history of hypertension, opioid dependence on methadone, anxiety presents complaint of worsening shortness of breath over the past 2 to 3 days.  Also noted subjective fever and chills.  Shortness of breath worsens with exertion.  Check her oxygen at home and it was down to 80% on room air so patient called EMS and brought to ED for evaluation.  Patient has similar symptoms about 2 months ago and was diagnosed with pneumonia versus malignancy.  Otherwise denies known sick contact or recent travel.  Further denies headache, dizziness, chest pain, abdominal pain, vomiting, diarrhea, urinary symptoms, and leg swelling.

## 2024-05-07 NOTE — ED PROVIDER NOTE - CARE PLAN
Principal Discharge DX:	Acute respiratory failure with hypoxia  Secondary Diagnosis:	Pneumonia  Secondary Diagnosis:	Elevated troponin   1

## 2024-05-07 NOTE — ED PROVIDER NOTE - DIFFERENTIAL DIAGNOSIS
PNA, pleural effusion, pericardial effusion, pneumothorax, PE, CHF exacerbation, COPD, flu, covid, RSV Differential Diagnosis

## 2024-05-08 DIAGNOSIS — J96.01 ACUTE RESPIRATORY FAILURE WITH HYPOXIA: ICD-10-CM

## 2024-05-08 PROBLEM — F11.20 OPIOID DEPENDENCE, UNCOMPLICATED: Chronic | Status: ACTIVE | Noted: 2024-02-11

## 2024-05-08 PROBLEM — I10 ESSENTIAL (PRIMARY) HYPERTENSION: Chronic | Status: ACTIVE | Noted: 2024-02-11

## 2024-05-08 LAB
ALBUMIN SERPL ELPH-MCNC: 3.8 G/DL — SIGNIFICANT CHANGE UP (ref 3.5–5.2)
ALP SERPL-CCNC: 67 U/L — SIGNIFICANT CHANGE UP (ref 30–115)
ALT FLD-CCNC: 32 U/L — SIGNIFICANT CHANGE UP (ref 0–41)
AMPHET UR-MCNC: NEGATIVE — SIGNIFICANT CHANGE UP
ANION GAP SERPL CALC-SCNC: 11 MMOL/L — SIGNIFICANT CHANGE UP (ref 7–14)
APPEARANCE UR: CLEAR — SIGNIFICANT CHANGE UP
AST SERPL-CCNC: 45 U/L — HIGH (ref 0–41)
BARBITURATES UR SCN-MCNC: NEGATIVE — SIGNIFICANT CHANGE UP
BASOPHILS # BLD AUTO: 0.02 K/UL — SIGNIFICANT CHANGE UP (ref 0–0.2)
BASOPHILS NFR BLD AUTO: 0.1 % — SIGNIFICANT CHANGE UP (ref 0–1)
BENZODIAZ UR-MCNC: POSITIVE
BILIRUB SERPL-MCNC: 0.4 MG/DL — SIGNIFICANT CHANGE UP (ref 0.2–1.2)
BILIRUB UR-MCNC: NEGATIVE — SIGNIFICANT CHANGE UP
BUN SERPL-MCNC: 10 MG/DL — SIGNIFICANT CHANGE UP (ref 10–20)
CALCIUM SERPL-MCNC: 8.7 MG/DL — SIGNIFICANT CHANGE UP (ref 8.4–10.4)
CHLORIDE SERPL-SCNC: 101 MMOL/L — SIGNIFICANT CHANGE UP (ref 98–110)
CO2 SERPL-SCNC: 26 MMOL/L — SIGNIFICANT CHANGE UP (ref 17–32)
COCAINE METAB.OTHER UR-MCNC: NEGATIVE — SIGNIFICANT CHANGE UP
COLOR SPEC: YELLOW — SIGNIFICANT CHANGE UP
CREAT SERPL-MCNC: 0.8 MG/DL — SIGNIFICANT CHANGE UP (ref 0.7–1.5)
DIFF PNL FLD: NEGATIVE — SIGNIFICANT CHANGE UP
EGFR: 88 ML/MIN/1.73M2 — SIGNIFICANT CHANGE UP
EOSINOPHIL # BLD AUTO: 0 K/UL — SIGNIFICANT CHANGE UP (ref 0–0.7)
EOSINOPHIL NFR BLD AUTO: 0 % — SIGNIFICANT CHANGE UP (ref 0–8)
GAS PNL BLDA: SIGNIFICANT CHANGE UP
GLUCOSE BLDC GLUCOMTR-MCNC: 91 MG/DL — SIGNIFICANT CHANGE UP (ref 70–99)
GLUCOSE SERPL-MCNC: 146 MG/DL — HIGH (ref 70–99)
GLUCOSE UR QL: NEGATIVE MG/DL — SIGNIFICANT CHANGE UP
HCT VFR BLD CALC: 42.5 % — SIGNIFICANT CHANGE UP (ref 37–47)
HGB BLD-MCNC: 13.5 G/DL — SIGNIFICANT CHANGE UP (ref 12–16)
IMM GRANULOCYTES NFR BLD AUTO: 0.3 % — SIGNIFICANT CHANGE UP (ref 0.1–0.3)
KETONES UR-MCNC: NEGATIVE MG/DL — SIGNIFICANT CHANGE UP
LACTATE SERPL-SCNC: 2.4 MMOL/L — HIGH (ref 0.7–2)
LEUKOCYTE ESTERASE UR-ACNC: NEGATIVE — SIGNIFICANT CHANGE UP
LYMPHOCYTES # BLD AUTO: 1.67 K/UL — SIGNIFICANT CHANGE UP (ref 1.2–3.4)
LYMPHOCYTES # BLD AUTO: 10.8 % — LOW (ref 20.5–51.1)
MAGNESIUM SERPL-MCNC: 2 MG/DL — SIGNIFICANT CHANGE UP (ref 1.8–2.4)
MCHC RBC-ENTMCNC: 29 PG — SIGNIFICANT CHANGE UP (ref 27–31)
MCHC RBC-ENTMCNC: 31.8 G/DL — LOW (ref 32–37)
MCV RBC AUTO: 91.4 FL — SIGNIFICANT CHANGE UP (ref 81–99)
METHADONE UR-MCNC: POSITIVE
MONOCYTES # BLD AUTO: 0.39 K/UL — SIGNIFICANT CHANGE UP (ref 0.1–0.6)
MONOCYTES NFR BLD AUTO: 2.5 % — SIGNIFICANT CHANGE UP (ref 1.7–9.3)
MRSA PCR RESULT.: NEGATIVE — SIGNIFICANT CHANGE UP
NEUTROPHILS # BLD AUTO: 13.34 K/UL — HIGH (ref 1.4–6.5)
NEUTROPHILS NFR BLD AUTO: 86.3 % — HIGH (ref 42.2–75.2)
NITRITE UR-MCNC: NEGATIVE — SIGNIFICANT CHANGE UP
NRBC # BLD: 0 /100 WBCS — SIGNIFICANT CHANGE UP (ref 0–0)
OPIATES UR-MCNC: NEGATIVE — SIGNIFICANT CHANGE UP
PCP SPEC-MCNC: SIGNIFICANT CHANGE UP
PH UR: 7.5 — SIGNIFICANT CHANGE UP (ref 5–8)
PLATELET # BLD AUTO: 123 K/UL — LOW (ref 130–400)
PMV BLD: 11.4 FL — HIGH (ref 7.4–10.4)
POTASSIUM SERPL-MCNC: 4.5 MMOL/L — SIGNIFICANT CHANGE UP (ref 3.5–5)
POTASSIUM SERPL-SCNC: 4.5 MMOL/L — SIGNIFICANT CHANGE UP (ref 3.5–5)
PROCALCITONIN SERPL-MCNC: 18.8 NG/ML — HIGH (ref 0.02–0.1)
PROPOXYPHENE QUALITATIVE URINE RESULT: NEGATIVE — SIGNIFICANT CHANGE UP
PROT SERPL-MCNC: 6.5 G/DL — SIGNIFICANT CHANGE UP (ref 6–8)
PROT UR-MCNC: NEGATIVE MG/DL — SIGNIFICANT CHANGE UP
RBC # BLD: 4.65 M/UL — SIGNIFICANT CHANGE UP (ref 4.2–5.4)
RBC # FLD: 13.6 % — SIGNIFICANT CHANGE UP (ref 11.5–14.5)
SODIUM SERPL-SCNC: 138 MMOL/L — SIGNIFICANT CHANGE UP (ref 135–146)
SP GR SPEC: 1.02 — SIGNIFICANT CHANGE UP (ref 1–1.03)
TROPONIN T, HIGH SENSITIVITY RESULT: 60 NG/L — CRITICAL HIGH (ref 6–13)
TROPONIN T, HIGH SENSITIVITY RESULT: 94 NG/L — CRITICAL HIGH (ref 6–13)
UROBILINOGEN FLD QL: 1 MG/DL — SIGNIFICANT CHANGE UP (ref 0.2–1)
WBC # BLD: 15.47 K/UL — HIGH (ref 4.8–10.8)
WBC # FLD AUTO: 15.47 K/UL — HIGH (ref 4.8–10.8)

## 2024-05-08 PROCEDURE — 80053 COMPREHEN METABOLIC PANEL: CPT

## 2024-05-08 PROCEDURE — 92610 EVALUATE SWALLOWING FUNCTION: CPT | Mod: GN

## 2024-05-08 PROCEDURE — 84145 PROCALCITONIN (PCT): CPT

## 2024-05-08 PROCEDURE — 87640 STAPH A DNA AMP PROBE: CPT

## 2024-05-08 PROCEDURE — 82803 BLOOD GASES ANY COMBINATION: CPT

## 2024-05-08 PROCEDURE — 99223 1ST HOSP IP/OBS HIGH 75: CPT | Mod: GC

## 2024-05-08 PROCEDURE — 87449 NOS EACH ORGANISM AG IA: CPT

## 2024-05-08 PROCEDURE — 87899 AGENT NOS ASSAY W/OPTIC: CPT

## 2024-05-08 PROCEDURE — 82962 GLUCOSE BLOOD TEST: CPT

## 2024-05-08 PROCEDURE — 85025 COMPLETE CBC W/AUTO DIFF WBC: CPT

## 2024-05-08 PROCEDURE — 84484 ASSAY OF TROPONIN QUANT: CPT

## 2024-05-08 PROCEDURE — 87641 MR-STAPH DNA AMP PROBE: CPT

## 2024-05-08 PROCEDURE — 80346 BENZODIAZEPINES1-12: CPT

## 2024-05-08 PROCEDURE — 80358 DRUG SCREENING METHADONE: CPT

## 2024-05-08 PROCEDURE — 85014 HEMATOCRIT: CPT

## 2024-05-08 PROCEDURE — 87389 HIV-1 AG W/HIV-1&-2 AB AG IA: CPT

## 2024-05-08 PROCEDURE — 36415 COLL VENOUS BLD VENIPUNCTURE: CPT

## 2024-05-08 PROCEDURE — 85018 HEMOGLOBIN: CPT

## 2024-05-08 PROCEDURE — 99223 1ST HOSP IP/OBS HIGH 75: CPT

## 2024-05-08 PROCEDURE — 80307 DRUG TEST PRSMV CHEM ANLYZR: CPT

## 2024-05-08 PROCEDURE — 84132 ASSAY OF SERUM POTASSIUM: CPT

## 2024-05-08 PROCEDURE — 87086 URINE CULTURE/COLONY COUNT: CPT

## 2024-05-08 PROCEDURE — 84295 ASSAY OF SERUM SODIUM: CPT

## 2024-05-08 PROCEDURE — 94660 CPAP INITIATION&MGMT: CPT

## 2024-05-08 PROCEDURE — 83735 ASSAY OF MAGNESIUM: CPT

## 2024-05-08 PROCEDURE — 81003 URINALYSIS AUTO W/O SCOPE: CPT

## 2024-05-08 PROCEDURE — 82330 ASSAY OF CALCIUM: CPT

## 2024-05-08 PROCEDURE — 83605 ASSAY OF LACTIC ACID: CPT

## 2024-05-08 RX ORDER — IPRATROPIUM/ALBUTEROL SULFATE 18-103MCG
3 AEROSOL WITH ADAPTER (GRAM) INHALATION EVERY 6 HOURS
Refills: 0 | Status: DISCONTINUED | OUTPATIENT
Start: 2024-05-08 | End: 2024-05-10

## 2024-05-08 RX ORDER — ALPRAZOLAM 0.25 MG
2 TABLET ORAL ONCE
Refills: 0 | Status: DISCONTINUED | OUTPATIENT
Start: 2024-05-08 | End: 2024-05-08

## 2024-05-08 RX ORDER — CEFEPIME 1 G/1
INJECTION, POWDER, FOR SOLUTION INTRAMUSCULAR; INTRAVENOUS
Refills: 0 | Status: DISCONTINUED | OUTPATIENT
Start: 2024-05-08 | End: 2024-05-09

## 2024-05-08 RX ORDER — INFLUENZA VIRUS VACCINE 15; 15; 15; 15 UG/.5ML; UG/.5ML; UG/.5ML; UG/.5ML
0.5 SUSPENSION INTRAMUSCULAR ONCE
Refills: 0 | Status: DISCONTINUED | OUTPATIENT
Start: 2024-05-08 | End: 2024-05-10

## 2024-05-08 RX ORDER — CEFEPIME 1 G/1
2000 INJECTION, POWDER, FOR SOLUTION INTRAMUSCULAR; INTRAVENOUS ONCE
Refills: 0 | Status: COMPLETED | OUTPATIENT
Start: 2024-05-08 | End: 2024-05-08

## 2024-05-08 RX ORDER — ONDANSETRON 8 MG/1
4 TABLET, FILM COATED ORAL EVERY 8 HOURS
Refills: 0 | Status: DISCONTINUED | OUTPATIENT
Start: 2024-05-08 | End: 2024-05-10

## 2024-05-08 RX ORDER — CEFEPIME 1 G/1
2000 INJECTION, POWDER, FOR SOLUTION INTRAMUSCULAR; INTRAVENOUS EVERY 8 HOURS
Refills: 0 | Status: DISCONTINUED | OUTPATIENT
Start: 2024-05-08 | End: 2024-05-09

## 2024-05-08 RX ORDER — VANCOMYCIN HCL 1 G
VIAL (EA) INTRAVENOUS
Refills: 0 | Status: DISCONTINUED | OUTPATIENT
Start: 2024-05-08 | End: 2024-05-09

## 2024-05-08 RX ORDER — VANCOMYCIN HCL 1 G
1750 VIAL (EA) INTRAVENOUS EVERY 12 HOURS
Refills: 0 | Status: DISCONTINUED | OUTPATIENT
Start: 2024-05-08 | End: 2024-05-09

## 2024-05-08 RX ORDER — HEPARIN SODIUM 5000 [USP'U]/ML
5000 INJECTION INTRAVENOUS; SUBCUTANEOUS EVERY 12 HOURS
Refills: 0 | Status: DISCONTINUED | OUTPATIENT
Start: 2024-05-08 | End: 2024-05-09

## 2024-05-08 RX ORDER — ACETAMINOPHEN 500 MG
650 TABLET ORAL EVERY 6 HOURS
Refills: 0 | Status: DISCONTINUED | OUTPATIENT
Start: 2024-05-08 | End: 2024-05-10

## 2024-05-08 RX ORDER — TIOTROPIUM BROMIDE AND OLODATEROL 3.124; 2.736 UG/1; UG/1
2 SPRAY, METERED RESPIRATORY (INHALATION) DAILY
Refills: 0 | Status: DISCONTINUED | OUTPATIENT
Start: 2024-05-08 | End: 2024-05-10

## 2024-05-08 RX ORDER — ALPRAZOLAM 0.25 MG
2 TABLET ORAL THREE TIMES A DAY
Refills: 0 | Status: DISCONTINUED | OUTPATIENT
Start: 2024-05-08 | End: 2024-05-10

## 2024-05-08 RX ORDER — VANCOMYCIN HCL 1 G
1750 VIAL (EA) INTRAVENOUS ONCE
Refills: 0 | Status: COMPLETED | OUTPATIENT
Start: 2024-05-08 | End: 2024-05-08

## 2024-05-08 RX ORDER — LANOLIN ALCOHOL/MO/W.PET/CERES
3 CREAM (GRAM) TOPICAL AT BEDTIME
Refills: 0 | Status: DISCONTINUED | OUTPATIENT
Start: 2024-05-08 | End: 2024-05-10

## 2024-05-08 RX ADMIN — CEFEPIME 100 MILLIGRAM(S): 1 INJECTION, POWDER, FOR SOLUTION INTRAMUSCULAR; INTRAVENOUS at 02:30

## 2024-05-08 RX ADMIN — Medication 3 MILLIGRAM(S): at 23:56

## 2024-05-08 RX ADMIN — CEFEPIME 100 MILLIGRAM(S): 1 INJECTION, POWDER, FOR SOLUTION INTRAMUSCULAR; INTRAVENOUS at 15:12

## 2024-05-08 RX ADMIN — Medication 60 MILLIGRAM(S): at 06:23

## 2024-05-08 RX ADMIN — Medication 125 MILLIGRAM(S): at 02:28

## 2024-05-08 RX ADMIN — HEPARIN SODIUM 5000 UNIT(S): 5000 INJECTION INTRAVENOUS; SUBCUTANEOUS at 06:23

## 2024-05-08 RX ADMIN — Medication 250 MILLIGRAM(S): at 02:28

## 2024-05-08 RX ADMIN — HEPARIN SODIUM 5000 UNIT(S): 5000 INJECTION INTRAVENOUS; SUBCUTANEOUS at 17:53

## 2024-05-08 RX ADMIN — Medication 250 MILLIGRAM(S): at 22:19

## 2024-05-08 RX ADMIN — CEFEPIME 100 MILLIGRAM(S): 1 INJECTION, POWDER, FOR SOLUTION INTRAMUSCULAR; INTRAVENOUS at 22:19

## 2024-05-08 RX ADMIN — Medication 2 MILLIGRAM(S): at 23:56

## 2024-05-08 RX ADMIN — CEFEPIME 100 MILLIGRAM(S): 1 INJECTION, POWDER, FOR SOLUTION INTRAMUSCULAR; INTRAVENOUS at 06:23

## 2024-05-08 RX ADMIN — Medication 2 MILLIGRAM(S): at 16:39

## 2024-05-08 RX ADMIN — Medication 60 MILLIGRAM(S): at 17:53

## 2024-05-08 NOTE — H&P ADULT - ATTENDING COMMENTS
53F w/ PMHx of HTN, hx Opiate dependence on Methadone, and ISSA presents to ED for SOB and Hypoxia. Admitted for Acute Hypoxic respiratory failure 2/2 to CAP.    #Acute Hypoxic and Hypercapnic respiratory failure   #CAP  #Suspected underlying COPD  #Sepsis, POA (leukocytosis/tachycardia)  CT Chest reviewed showing- Consolidations within the left upper and left lower lobes. Findings can be seen with pneumonia.  Former smoker, 20 pack year smoking hx  sp BIPAP overnight, now on RA saturating 94-95%  - Continue cefepime/vancomycin  - Cont IV solumedrol 60 BID, taper to 40mg qD tomorrow  - ID eval and pulm eval pending  - Fu Bcx , UCx, procalcitonin  - Mrsa nares, urine strep and legionella, procal     #Opioid dependance on methadone   - Cont home methadone dose    #Troponemia   Likely demand ischemia, trended down  - DC tele    #HTN  - Hold BP meds    DVT PPX, heparin    #Progress Note Handoff  Pending (specify): cont IV abx, steroids, pulm, ID  Family discussion: yo pt regarding plan of care  Disposition: Home

## 2024-05-08 NOTE — PATIENT PROFILE ADULT - FUNCTIONAL ASSESSMENT - BASIC MOBILITY 6.
3-calculated by average/Not able to assess (calculate score using [facility] averaging method)

## 2024-05-08 NOTE — H&P ADULT - HISTORY OF PRESENT ILLNESS
53F w/ PMHx of HTN, hx Opiate dependence on Methadone, and ISSA presents to ED for SOB and Hypoxia. At time of my exam patient was lethargic, was found to have elevated CO2 and put on BIPAP. History taken from the chart. Spoke with daughter briefly however was not with her today but said she sounded terrible on the phone and was having fevers and SOB so she called EMS for her to come in.   Of note in 2024 patient was admitted for multilobar PNA and pulmonary nodules with concern for malignancy.   Also per daughter,  just  one week ago.     In the ED,   Vitals: /56, , RR 18, T 98.5, satting 99% on non rebreather   Labs: WBC 12.11, Hgb 15, , Na 138, K 4.2, BUN 7, Cr 1, troponin 118, bnp 99, covid, flu, rsv negative   Imaging: CT PE No CTA evidence of acute pulmonary embolus.Consolidations within the left upper and left lower lobes.    53F w/ PMHx of HTN, hx Opiate dependence on Methadone, and ISSA presents to ED for SOB and Hypoxia. At time of my exam patient was lethargic, was found to have elevated CO2 and put on BIPAP. History taken from the chart. Spoke with daughter briefly however was not with her today but said she sounded terrible on the phone and was having fevers and SOB so she called EMS for her to come in.   Of note in 2024 patient was admitted for multilobar PNA and pulmonary nodules with concern for malignancy. Per daughter recently followed up at [facility] and had CT done which was negative except for LLL opacity.   Also per daughter,  just  one week ago.     In the ED,   Vitals: /56, , RR 18, T 98.5, satting 99% on non rebreather   Labs: WBC 12.11, Hgb 15, , Na 138, K 4.2, BUN 7, Cr 1, troponin 118, bnp 99, covid, flu, rsv negative   Imaging: CT PE No CTA evidence of acute pulmonary embolus. Consolidations within the left upper and left lower lobes.     Given Rocephin and AZT and IVF   At time of my exam pt was noted to be lethargic, changed from baseline. FS was 98, BP normotensive. Pupils equal reactive to light. ABG was done and showed pH 7.31, CO2 60, Hco3 30. Patient was placed on bipap.

## 2024-05-08 NOTE — CONSULT NOTE ADULT - ATTENDING COMMENTS
Ms. Deven Osuna was seen and examined at bedside in the emergency department today.  Patient has a history of tobacco and opiate use, and pulmonary is now consulted for recurrent pneumonia with multiple episodes in the past few months.  Based on my review of the patient's imaging, there has been progression of left lower lobe infiltrate concerning for infectious pneumonia, and on history patient underwent CT scan with her outpatient doctor last month demonstrating interval resolution of this infiltrate.  Given the recurrent nature of this, as well as other evidence of infection, I am concerned for possible drug use versus aspiration, although this may just represent 2 separate cases of community-acquired pneumonia.  Recommend further workup for atypical pneumonias, as well as outpatient pulmonary follow-up in 6 weeks with repeat imaging.  Patient should also be started on treatment for COPD with LAMA/LABA inhalers as well as albuterol as needed.  Patient was also already initiated on a course of systemic corticosteroids, recommend completing a 5-day course of 40 mg prednisone daily (or IV equivalent).  I agree with the fellow note, with the exceptions listed in my attestation above.  The remainder of impression and plan per fellow note.

## 2024-05-08 NOTE — CONSULT NOTE ADULT - ASSESSMENT
ASSESSMENT  53F w/ PMHx of HTN, hx Opiate dependence on Methadone, and ISSA presents to ED for SOB and Hypoxia.    IMPRESSION  #JEANNE/LLL pneumonia - at risk for GN pneumonia given recurrence  - CT Angio Chest PE Protocol w/ IV Cont (05.07.24 @ 22:10): No CTA evidence of acute pulmonary embolus. Consolidations within the left upper and left lower lobes. Findings can  be seen with pneumonia. Recommend radiographic follow-up after treatment.    #Opiate dependence on Methadone   #ISSA    #Obesity BMI (kg/m2): 38.3  #DM   #Abx allergy: No Known Allergies    RECOMMENDATIONS  This is a preliminary incomplete pended note, all final recommendations to follow after interview and examination of the patient.    Please call or message on Microsoft Teams if with any questions.  Spectra [phone]     ASSESSMENT  53F w/ PMHx of HTN, hx Opiate dependence on Methadone, and ISSA presents to ED for SOB and Hypoxia.    IMPRESSION  #JEANNE/LLL pneumonia -   -  CT Angio Chest PE Protocol w/ IV Cont (02.11.24 @ 17:54): Bilateral lower lobe opacities and pulmonary nodules which may be  secondary to infectious/inflammatory etiologies. Neoplastic process is   not excluded. Nonspecific pretracheal 1.7 cm lymph node and other prominent mediastinal  lymph nodes. Additionally, and large periaortic and portacaval lymph  nodes as above, nonspecific. Follow-up per oncologic protocol. No central pulmonary emboli or right heart strain.  - CT Angio Chest PE Protocol w/ IV Cont (05.07.24 @ 22:10): No CTA evidence of acute pulmonary embolus. Consolidations within the left upper and left lower lobes. Findings can  be seen with pneumonia. Recommend radiographic follow-up after treatment.    #Opiate dependence on Methadone   #ISSA    #Obesity BMI (kg/m2): 38.3  #DM   #Abx allergy: No Known Allergies    RECOMMENDATIONS  - CT chest with more apparent left lobe oapcities compared to CT imaging in 2/2024, although reported history of resolution in between   - narrow cefepime to ceftriaxone 2g daily and azithromycin 500 mg q 24 hours   - check procalcitonin   - check sputum cx   - reports dysphagia? although unsure if related to underlying anxiety -- consider speech and swallow given recurrence   - screen HIV     Please call or message on Microsoft Teams if with any questions.  Spectra [phone]

## 2024-05-08 NOTE — ED ADULT NURSE REASSESSMENT NOTE - NS ED NURSE REASSESS COMMENT FT1
Contacted MAR regarding change in pt status - pt is very lethargic and responds to a lot of stimulation. After stimulation, pt responded to all LOC questions but is still very lethargic. MD stated he will come to see patient as soon as possible
No

## 2024-05-08 NOTE — CONSULT NOTE ADULT - SUBJECTIVE AND OBJECTIVE BOX
CORRIE RODRIGUEZ  53y, Female  Allergy: No Known Allergies      CHIEF COMPLAINT: SOB + Hypoxia (08 May 2024 12:31)      LOS      HPI:  53F w/ PMHx of HTN, hx Opiate dependence on Methadone, and ISSA presents to ED for SOB and Hypoxia. At time of my exam patient was lethargic, was found to have elevated CO2 and put on BIPAP. History taken from the chart. Spoke with daughter briefly however was not with her today but said she sounded terrible on the phone and was having fevers and SOB so she called EMS for her to come in.   Of note in 2024 patient was admitted for multilobar PNA and pulmonary nodules with concern for malignancy. Per daughter recently followed up at [facility] and had CT done which was negative except for LLL opacity.   Also per daughter,  just  one week ago.     In the ED,   Vitals: /56, , RR 18, T 98.5, satting 99% on non rebreather   Labs: WBC 12.11, Hgb 15, , Na 138, K 4.2, BUN 7, Cr 1, troponin 118, bnp 99, covid, flu, rsv negative   Imaging: CT PE No CTA evidence of acute pulmonary embolus. Consolidations within the left upper and left lower lobes.     Given Rocephin and AZT and IVF   At time of my exam pt was noted to be lethargic, changed from baseline. FS was 98, BP normotensive. Pupils equal reactive to light. ABG was done and showed pH 7.31, CO2 60, Hco3 30. Patient was placed on bipap.      (08 May 2024 01:52)      INFECTIOUS DISEASE HISTORY:  History as above.    PAST MEDICAL & SURGICAL HISTORY:  Benign essential HTN      Opiate dependence      H/O tubal ligation          FAMILY HISTORY  Family history reviewed and non-contributory      SOCIAL HISTORY  Social History:  Denies etoh use, on methadone       ROS  General: Denies rigors, nightsweats  HEENT: Denies headache, rhinorrhea, sore throat, eye pain  CV: Denies CP, palpitations  PULM: Denies wheezing, hemoptysis  GI: Denies hematemesis, hematochezia, melena  : Denies discharge, hematuria  MSK: Denies arthralgias, myalgias  SKIN: Denies rash, lesions  NEURO: Denies paresthesias, weakness  PSYCH: Denies depression, anxiety    VITALS:  T(F): 97.7, Max: 99.6 (24 @ 23:44)  HR: 86  BP: 122/73  RR: 18Vital Signs Last 24 Hrs  T(C): 36.5 (08 May 2024 16:03), Max: 37.6 (07 May 2024 23:44)  T(F): 97.7 (08 May 2024 16:03), Max: 99.6 (07 May 2024 23:44)  HR: 86 (08 May 2024 16:03) (73 - 130)  BP: 122/73 (08 May 2024 16:03) (98/54 - 126/56)  BP(mean): 69 (08 May 2024 07:49) (69 - 69)  RR: 18 (08 May 2024 16:03) (14 - 18)  SpO2: 97% (08 May 2024 16:03) (94% - 97%)    Parameters below as of 08 May 2024 08:55  Patient On (Oxygen Delivery Method): room air        PHYSICAL EXAM:  Gen: NAD, resting in bed  HEENT: Normocephalic, atraumatic  Neck: supple, no lymphadenopathy  CV: Regular rate & regular rhythm  Lungs: decreased BS at bases, no fremitus  Abdomen: Soft, BS present  Ext: Warm, well perfused  Neuro: non focal, awake  Skin: no rash, no erythema  Lines: no phlebitis    TESTS & MEASUREMENTS:                        13.5   15.47 )-----------( 123      ( 08 May 2024 06:35 )             42.5     05-08    138  |  101  |  10  ----------------------------<  146<H>  4.5   |  26  |  0.8    Ca    8.7      08 May 2024 06:35  Mg     2.0     05-08    TPro  6.5  /  Alb  3.8  /  TBili  0.4  /  DBili  x   /  AST  45<H>  /  ALT  32  /  AlkPhos  67  05-08      LIVER FUNCTIONS - ( 08 May 2024 06:35 )  Alb: 3.8 g/dL / Pro: 6.5 g/dL / ALK PHOS: 67 U/L / ALT: 32 U/L / AST: 45 U/L / GGT: x           Urinalysis Basic - ( 08 May 2024 11:30 )    Color: Yellow / Appearance: Clear / S.022 / pH: x  Gluc: x / Ketone: Negative mg/dL  / Bili: Negative / Urobili: 1.0 mg/dL   Blood: x / Protein: Negative mg/dL / Nitrite: Negative   Leuk Esterase: Negative / RBC: x / WBC x   Sq Epi: x / Non Sq Epi: x / Bacteria: x          Lactate, Blood: 2.4 mmol/L (24 @ 00:55)  Blood Gas Venous - Lactate: 2.9 mmol/L (24 @ 20:22)      INFECTIOUS DISEASES TESTING  Procalcitonin: 18.80 ng/mL (24 @ 06:35)  MRSA PCR Result.: Negative (24 @ 02:50)  Procalcitonin, Serum: 0.08 ng/mL (24 @ 17:00)      RADIOLOGY & ADDITIONAL TESTS:  I have personally reviewed the last Chest xray  CXR  Xray Chest 1 View- PORTABLE-Urgent:   ACC: [id number] EXAM:  XR CHEST PORTABLE URGENT 1V   ORDERED BY: PAULA SANTANA     PROCEDURE DATE:  2024          INTERPRETATION:  STUDY INDICATION: Chest Pain    TECHNIQUE:  Portable frontal view of the chest obtained.    COMPARISON: XR CHEST 2024.    FINDINGS/  IMPRESSION:    Left lung opacities. No pneumothorax.    Stable cardiomediastinal silhouette.    Unchanged osseous structures.    --- End of Report ---            CASTILLO GARCIA MD; Attending Radiologist  This document has been electronically signed. May  8 2024  6:11AM (24 @ 20:41)      CT  CT Angio Chest PE Protocol w/ IV Cont:   ACC: [id number] EXAM:  CT ANGIO CHEST PULM ART WAWIC   ORDERED BY: PAULA SANTANA     PROCEDURE DATE:  2024          INTERPRETATION:  CLINICAL HISTORY / REASON FOR EXAM: Shortness of breath    TECHNIQUE: Multislice helical sections were obtained from the thoracic   inlet to the lung bases during rapid administration of following   administration of 65 mL Omnipaque 350 intravenous contrast using a CTA   pulmonary embolism protocol. Thin sections were reconstructed through the   pulmonary vasculature. Coronal, sagittal and 3D/MIP reformatted images   are also submitted.    COMPARISON CT: CTA chest from 2024    OTHER STUDIES USED FOR CORRELATION: Chest radiograph from May 7, 2024      FINDINGS:    PULMONARY EMBOLUS: No central orsegmental pulmonary embolus.    TUBES/LINES: None.    LUNGS, PLEURA, AIRWAYS: Consolidative opacities within the left upper and   left lower lobe. Bilateral lower lobe peribronchial thickening.   Additional areas of mosaic ground glass attenuation. Previously seen left   lower lobe 1 cm nodule is obscured by consolidations. No pneumothorax.    THORACIC NODES: Multiple enlarged paratracheal, subcarinal and hilar   lymph nodes.    MEDIASTINUM/GREAT VESSELS: No pericardial effusion. Heart size   unremarkable. No aneurysmal dilation of the thoracic aorta.    VISUALIZED UPPER ABDOMEN: Hepatic steatosis.    BONES/SOFT TISSUES: No acute osseous abnormality.      IMPRESSION:    No CTA evidence of acute pulmonary embolus.    Consolidations within the left upper and left lower lobes. Findings can   be seen with pneumonia. Recommend radiographic follow-up after treatment.    --- End of Report ---            JUANITA HENDRICKS MD; Attending Radiologist  This document has been electronically signed. May  7 2024 10:14PM (24 @ 22:10)      CARDIOLOGY TESTING  12 Lead ECG:   Ventricular Rate 116 BPM    Atrial Rate 116 BPM    P-R Interval 150 ms    QRS Duration 68 ms    Q-T Interval 294 ms    QTC Calculation(Bazett) 408 ms    P Axis 45 degrees    R Axis 60 degrees    T Axis 51 degrees    Diagnosis Line Sinus tachycardia  Otherwise normal ECG    Confirmed by LENNIE LONG, ROSENDO () on 2024 11:07:21 PM (24 @ 20:33)      MEDICATIONS  ALPRAZolam 2 Oral once  cefepime   IVPB 2000 IV Intermittent every 8 hours  cefepime   IVPB     heparin   Injectable 5000 SubCutaneous every 12 hours  influenza   Vaccine 0.5 IntraMuscular once  methylPREDNISolone sodium succinate Injectable 60 IV Push two times a day  tiotropium 2.5 MICROgram(s)/olodaterol 2.5 MICROgram(s) (STIOLTO) Inhaler 2 Inhalation daily  vancomycin  IVPB 1750 IV Intermittent every 12 hours  vancomycin  IVPB         Weight  Weight (kg): 121.1 (24 @ 19:16)    ANTIBIOTICS:  cefepime   IVPB      cefepime   IVPB 2000 milliGRAM(s) IV Intermittent every 8 hours  vancomycin  IVPB 1750 milliGRAM(s) IV Intermittent every 12 hours  vancomycin  IVPB          ALLERGIES:  No Known Allergies

## 2024-05-08 NOTE — CONSULT NOTE ADULT - ASSESSMENT
Impression    Acute hypercapnic respiratory failure  Acute hypoxic respiratory failure  CAP  LLL nodular infiltrate  cannot r/o additional neoplastic process  HO Opiate dependence on Methadone,    Plan     Impression    Acute hypercapnic respiratory failure  Acute hypoxic respiratory failure  CAP  LLL ground glass infiltrate  Suspected COPD  Probable ESSIE  HO Opiate dependence on Methadone,    Plan    CT chest reviewed, recurrence of LLL pna, possible aspiration  Likely CAP with possible COPD exacerbation, not wheezing today  Full RVP  Ctx + azithro, augmentin on d/c for a total of 7 days  Prednisone 40mg 5 days total  Start on stiolto 2,5 2 puffs daily  Albuterol nebulizer q4hrs and PRN  NIV QHS and PRN  Aspiration precautions   If breathing does not improve would consider speech and swallow work up  Follow up outpatient pulmonary for repeat CT chest imaging in 6 weeks, PFTs, and sleep study Impression    Acute hypercapnic respiratory failure  Acute hypoxic respiratory failure  CAP  LLL ground glass infiltrate  Suspected COPD  Probable ESSIE  HO Opiate dependence on Methadone,    Plan    CT chest reviewed, recurrence of LLL pna, possible aspiration  Likely CAP with possible COPD exacerbation, not wheezing today  Full RVP, UDS  Ctx + azithro, augmentin on d/c for a total of 7 days  Prednisone 40mg 5 days total  Start on stiolto 2,5 2 puffs daily  Albuterol nebulizer q4hrs and PRN  NIV QHS and PRN  Aspiration precautions   If breathing does not improve would consider speech and swallow work up  Follow up outpatient pulmonary for repeat CT chest imaging in 6 weeks, PFTs, and sleep study

## 2024-05-08 NOTE — H&P ADULT - TIME BILLING
Total time spent to complete patient's bedside assessment, review medical chart, discuss medical plan of care with covering medical team was more than 75 minutes  with >50% of time spent face to face with patient, discussion with patient/family and/or coordination of care. My note supersedes the medical resident note in case of discrepancy.

## 2024-05-08 NOTE — H&P ADULT - ASSESSMENT
53F w/ PMHx of HTN, hx Opiate dependence on Methadone, and ISSA presents to ED for SOB and Hypoxia. Admitted for Acute Hypoxic respiratory failure 2/2 to CAP.     Daughter is going to bring in medications in AM, please confirm in AM. She does not have medications with her currently, [facility]     #Acute Hypoxic and Hypercapnic respiratory failure 2/2 to CAP, ro post obstructive pna   #Possible Underlying COPD?   - Patient presented with SOB, fevers, and hypoxia   - CTE - Consolidations within the left upper and left lower lobes. Findings can be seen with pneumonia.  - Sepsis present on admission (leukocytosis and tachycardia)   - S/p IVF, Rocephin, and Vanc   - ABG: pH 7.31, CO2 60, HCO3 30, placed on BIPAP   - Patient has 20 pack year hx, smokes vape now     Plan   - Patient with recent hospitalization requiring IV abx so will cover with Cefepime and Vanc for now   - Pulm eval for possible post obstructive PNA   - ID eval   - Fu Bcx   - Mrsa nares, urine strep and legionella, procal   - Solumedrol 60 BID, Duoneb PRN   - C/w BIPAP, attempt to wean off in AM     #Opioid dependance on methadone   - Uses [facility]   - please confirm dose in AM     #Troponemia   - Troponin 118, BNP 99  - ECG sinus tachy no ischemia   - Unable to assess patient for chest pain   - Repeat ECG and troponin     #HTN  - Hold BP meds in setting of sepsis     Misc   DVT: Heparin   GI: NI   Diet: NPO on bipap   Activity: ANDREIA    53F w/ PMHx of HTN, hx Opiate dependence on Methadone, and ISSA presents to ED for SOB and Hypoxia. Admitted for Acute Hypoxic respiratory failure 2/2 to CAP.     Daughter is going to bring in medications in AM, please confirm in AM. She does not have medications with her currently, needs to go to [facility] to get them.     #Acute Hypoxic and Hypercapnic respiratory failure 2/2 to CAP, ro post obstructive pna   #Possible Underlying COPD?   - Patient presented with SOB, fevers, and hypoxia   - CTE - Consolidations within the left upper and left lower lobes. Findings can be seen with pneumonia.  - Sepsis present on admission (leukocytosis and tachycardia)   - S/p IVF, Rocephin, and Vanc   - ABG: pH 7.31, CO2 60, HCO3 30, placed on BIPAP   - Patient has 20 pack year hx, smokes vape now     Plan   - Patient with recent hospitalization requiring IV abx so will cover with Cefepime and Vanc for now   - Pulm eval for possible post obstructive PNA   - ID eval   - Fu Bcx   - Mrsa nares, urine strep and legionella, procal   - Solumedrol 60 BID, Duoneb PRN   - C/w BIPAP, attempt to wean off in AM     #Opioid dependance on methadone   - Uses [facility]   - please confirm dose in AM   - utox     #Troponemia   - Troponin 118, BNP 99  - ECG sinus tachy no ischemia   - Unable to assess patient for chest pain   - Repeat ECG and troponin     #HTN  - Hold BP meds in setting of sepsis     Misc   DVT: Heparin   GI: NI   Diet: NPO on bipap   Activity: ANDREIA

## 2024-05-08 NOTE — H&P ADULT - NSHPPHYSICALEXAM_GEN_ALL_CORE
PHYSICAL EXAM:  GENERAL: Lethargic   HEAD:  Atraumatic, Normocephalic  EYES: EOMI, PERRLA, anicteric sclera  NECK: Supple, No JVD  CHEST/LUNG: Clear to auscultation bilaterally; No wheeze; No crackles; No accessory muscles used  HEART: Regular rate and rhythm; No murmurs;   ABDOMEN: Soft, Nontender, Nondistended; Bowel sounds present; No guarding  EXTREMITIES:  2+ Peripheral Pulses, No cyanosis or edema  PSYCH: Lethargic, responds to sternal rub but otherwise lethargic   NEUROLOGY: non-focal  SKIN: No rashes or lesions

## 2024-05-08 NOTE — PATIENT PROFILE ADULT - NSPROPTRIGHTNOTIFY_GEN_A_NUR
Mirvaso Pregnancy And Lactation Text: This medication has not been assigned a Pregnancy Risk Category. It is unknown if the medication is excreted in breast milk. declines

## 2024-05-08 NOTE — H&P ADULT - NSHPLABSRESULTS_GEN_ALL_CORE
.  LABS:                         15.0   12.11 )-----------( 141      ( 07 May 2024 20:21 )             47.0     05-07    138  |  100  |  7<L>  ----------------------------<  96  4.2   |  26  |  1.0    Ca    9.0      07 May 2024 20:21    TPro  6.8  /  Alb  4.1  /  TBili  0.4  /  DBili  x   /  AST  39  /  ALT  34  /  AlkPhos  77  05-07    PT/INR - ( 07 May 2024 20:21 )   PT: 13.10 sec;   INR: 1.15 ratio         PTT - ( 07 May 2024 20:21 )  PTT:30.1 sec  Urinalysis Basic - ( 07 May 2024 20:21 )    Color: x / Appearance: x / SG: x / pH: x  Gluc: 96 mg/dL / Ketone: x  / Bili: x / Urobili: x   Blood: x / Protein: x / Nitrite: x   Leuk Esterase: x / RBC: x / WBC x   Sq Epi: x / Non Sq Epi: x / Bacteria: x        Lactate, Blood: 2.4 mmol/L (05-08 @ 00:55)      RADIOLOGY, EKG & ADDITIONAL TESTS: Reviewed.

## 2024-05-08 NOTE — CONSULT NOTE ADULT - SUBJECTIVE AND OBJECTIVE BOX
Patient is a 53y old  Female who presents with a chief complaint of SOB + Hypoxia (08 May 2024 01:52)    HPI:  53F w/ PMHx of HTN, hx Opiate dependence on Methadone, and ISSA presents to ED for SOB and Hypoxia. At time of my exam patient was lethargic, was found to have elevated CO2 and put on BIPAP. History taken from the chart. Spoke with daughter briefly however was not with her today but said she sounded terrible on the phone and was having fevers and SOB so she called EMS for her to come in.   Of note in 2024 patient was admitted for multilobar PNA and pulmonary nodules with concern for malignancy. Per daughter recently followed up at [facility] and had CT done which was negative except for LLL opacity.   Also per daughter,  just  one week ago.     In the ED,   Vitals: /56, , RR 18, T 98.5, satting 99% on non rebreather   Labs: WBC 12.11, Hgb 15, , Na 138, K 4.2, BUN 7, Cr 1, troponin 118, bnp 99, covid, flu, rsv negative   Imaging: CT PE No CTA evidence of acute pulmonary embolus. Consolidations within the left upper and left lower lobes.     Given Rocephin and AZT and IVF   At time of my exam pt was noted to be lethargic, changed from baseline. FS was 98, BP normotensive. Pupils equal reactive to light. ABG was done and showed pH 7.31, CO2 60, Hco3 30. Patient was placed on bipap.      (08 May 2024 01:52)      PAST MEDICAL & SURGICAL HISTORY:  Benign essential HTN      Opiate dependence      H/O tubal ligation          SOCIAL HX:   Smoking                         ETOH                            Other    FAMILY HISTORY:  .  No cardiovascular or pulmonary family history     REVIEW OF SYSTEMS:    All ROS are negative exept per HPI       Allergies    No Known Allergies    Intolerances          PHYSICAL EXAM  Vital Signs Last 24 Hrs  T(C): 36.9 (08 May 2024 07:49), Max: 37.6 (07 May 2024 23:44)  T(F): 98.4 (08 May 2024 07:49), Max: 99.6 (07 May 2024 23:44)  HR: 74 (08 May 2024 08:55) (73 - 130)  BP: 100/59 (08 May 2024 08:55) (98/54 - 126/56)  BP(mean): 69 (08 May 2024 07:49) (69 - 69)  RR: 16 (08 May 2024 08:55) (14 - 18)  SpO2: 96% (08 May 2024 08:55) (94% - 96%)    Parameters below as of 08 May 2024 08:55  Patient On (Oxygen Delivery Method): room air        CONSTITUTIONAL:  Well nourished.  NAD    ENT:   Airway patent,   No thrush    EYES:   Clear bilaterally,   pupils equal,   round and reactive to light.    CARDIAC:   Normal rate,   regular rhythm.    no edema      RESPIRATORY:   No wheezing   Normal chest expansion  Not tachypneic,  No use of accessory muscles    GASTROINTESTINAL:  Abdomen soft, non-tender,   No guarding,   Positive BS    MUSCULOSKELETAL:   Range of motion is not limited,  No clubbing, cyanosis    NEUROLOGICAL:   Alert and oriented   No motor deficits.    SKIN:   Skin normal color for race,   No evidence of rash.      HEME LYMPH:   No cervical  lymphadenopathy.  no inguinal lymphadenopathy          LABS:                          13.5   15.47 )-----------( 123      ( 08 May 2024 06:35 )             42.5                                               05-08    138  |  101  |  10  ----------------------------<  146<H>  4.5   |  26  |  0.8    Ca    8.7      08 May 2024 06:35  Mg     2.0     05-08    TPro  6.5  /  Alb  3.8  /  TBili  0.4  /  DBili  x   /  AST  45<H>  /  ALT  32  /  AlkPhos  67  05-08      PT/INR - ( 07 May 2024 20:21 )   PT: 13.10 sec;   INR: 1.15 ratio         PTT - ( 07 May 2024 20:21 )  PTT:30.1 sec                                       Urinalysis Basic - ( 08 May 2024 11:30 )    Color: Yellow / Appearance: Clear / S.022 / pH: x  Gluc: x / Ketone: Negative mg/dL  / Bili: Negative / Urobili: 1.0 mg/dL   Blood: x / Protein: Negative mg/dL / Nitrite: Negative   Leuk Esterase: Negative / RBC: x / WBC x   Sq Epi: x / Non Sq Epi: x / Bacteria: x                                                  LIVER FUNCTIONS - ( 08 May 2024 06:35 )  Alb: 3.8 g/dL / Pro: 6.5 g/dL / ALK PHOS: 67 U/L / ALT: 32 U/L / AST: 45 U/L / GGT: x                                                                                            ABG - ( 08 May 2024 01:20 )  pH, Arterial: 7.31  pH, Blood: x     /  pCO2: 60    /  pO2: 65    / HCO3: 30    / Base Excess: 2.5   /  SaO2: 92.8                MEDICATIONS  (STANDING):  cefepime   IVPB      cefepime   IVPB 2000 milliGRAM(s) IV Intermittent every 8 hours  heparin   Injectable 5000 Unit(s) SubCutaneous every 12 hours  methylPREDNISolone sodium succinate Injectable 60 milliGRAM(s) IV Push two times a day  vancomycin  IVPB 1750 milliGRAM(s) IV Intermittent every 12 hours  vancomycin  IVPB        MEDICATIONS  (PRN):  acetaminophen     Tablet .. 650 milliGRAM(s) Oral every 6 hours PRN Temp greater or equal to 38C (100.4F), Mild Pain (1 - 3)  albuterol/ipratropium for Nebulization 3 milliLiter(s) Nebulizer every 6 hours PRN Shortness of Breath and/or Wheezing  aluminum hydroxide/magnesium hydroxide/simethicone Suspension 30 milliLiter(s) Oral every 4 hours PRN Dyspepsia  melatonin 3 milliGRAM(s) Oral at bedtime PRN Insomnia  ondansetron Injectable 4 milliGRAM(s) IV Push every 8 hours PRN Nausea and/or Vomiting      X-Rays reviewed:    CXR interpreted by me: Patient is a 53y old  Female who presents with a chief complaint of SOB + Hypoxia (08 May 2024 01:52)    HPI:  53F w/ PMHx of HTN, hx Opiate dependence on Methadone, and ISSA presents to ED for SOB and Hypoxia. At time of my exam patient was lethargic, was found to have elevated CO2 and put on BIPAP. History taken from the chart. Spoke with daughter briefly however was not with her today but said she sounded terrible on the phone and was having fevers and SOB so she called EMS for her to come in.   Of note in 2024 patient was admitted for multilobar PNA and pulmonary nodules with concern for malignancy. Per daughter recently followed up at [facility] and had CT done which was negative except for LLL opacity.   Also per daughter,  just  one week ago.     In the ED,   Vitals: /56, , RR 18, T 98.5, satting 99% on non rebreather   Labs: WBC 12.11, Hgb 15, , Na 138, K 4.2, BUN 7, Cr 1, troponin 118, bnp 99, covid, flu, rsv negative   Imaging: CT PE No CTA evidence of acute pulmonary embolus. Consolidations within the left upper and left lower lobes.     Given Rocephin and AZT and IVF   At time of my exam pt was noted to be lethargic, changed from baseline. FS was 98, BP normotensive. Pupils equal reactive to light. ABG was done and showed pH 7.31, CO2 60, Hco3 30. Patient was placed on bipap.      (08 May 2024 01:52)      PAST MEDICAL & SURGICAL HISTORY:  Benign essential HTN      Opiate dependence      H/O tubal ligation      SOCIAL HX:   Smoking        30+ pack/year smoker                 ETOH                            Other    FAMILY HISTORY:  .  No cardiovascular or pulmonary family history     REVIEW OF SYSTEMS:    All ROS are negative exept per HPI       Allergies    No Known Allergies    Intolerances      PHYSICAL EXAM  Vital Signs Last 24 Hrs  T(C): 36.9 (08 May 2024 07:49), Max: 37.6 (07 May 2024 23:44)  T(F): 98.4 (08 May 2024 07:49), Max: 99.6 (07 May 2024 23:44)  HR: 74 (08 May 2024 08:55) (73 - 130)  BP: 100/59 (08 May 2024 08:55) (98/54 - 126/56)  BP(mean): 69 (08 May 2024 07:49) (69 - 69)  RR: 16 (08 May 2024 08:55) (14 - 18)  SpO2: 96% (08 May 2024 08:55) (94% - 96%)    Parameters below as of 08 May 2024 08:55  Patient On (Oxygen Delivery Method): room air        CONSTITUTIONAL:  NAD    CARDIAC:   Normal rate,   regular rhythm.    BL LE edema    RESPIRATORY:   No wheezing   Normal chest expansion    GASTROINTESTINAL:  Abdomen soft, non-tender,   No guarding,     MUSCULOSKELETAL:   Range of motion is not limited,  No clubbing, cyanosis    NEUROLOGICAL:   Alert and oriented   No motor deficits.    SKIN:   Skin normal color for race,   No evidence of rash.        LABS:                          13.5   15.47 )-----------( 123      ( 08 May 2024 06:35 )             42.5                                               05-08    138  |  101  |  10  ----------------------------<  146<H>  4.5   |  26  |  0.8    Ca    8.7      08 May 2024 06:35  Mg     2.0     05-08    TPro  6.5  /  Alb  3.8  /  TBili  0.4  /  DBili  x   /  AST  45<H>  /  ALT  32  /  AlkPhos  67  05-08      PT/INR - ( 07 May 2024 20:21 )   PT: 13.10 sec;   INR: 1.15 ratio         PTT - ( 07 May 2024 20:21 )  PTT:30.1 sec                                       Urinalysis Basic - ( 08 May 2024 11:30 )    Color: Yellow / Appearance: Clear / S.022 / pH: x  Gluc: x / Ketone: Negative mg/dL  / Bili: Negative / Urobili: 1.0 mg/dL   Blood: x / Protein: Negative mg/dL / Nitrite: Negative   Leuk Esterase: Negative / RBC: x / WBC x   Sq Epi: x / Non Sq Epi: x / Bacteria: x                                                  LIVER FUNCTIONS - ( 08 May 2024 06:35 )  Alb: 3.8 g/dL / Pro: 6.5 g/dL / ALK PHOS: 67 U/L / ALT: 32 U/L / AST: 45 U/L / GGT: x                                                                                            ABG - ( 08 May 2024 01:20 )  pH, Arterial: 7.31  pH, Blood: x     /  pCO2: 60    /  pO2: 65    / HCO3: 30    / Base Excess: 2.5   /  SaO2: 92.8                MEDICATIONS  (STANDING):  cefepime   IVPB      cefepime   IVPB 2000 milliGRAM(s) IV Intermittent every 8 hours  heparin   Injectable 5000 Unit(s) SubCutaneous every 12 hours  methylPREDNISolone sodium succinate Injectable 60 milliGRAM(s) IV Push two times a day  vancomycin  IVPB 1750 milliGRAM(s) IV Intermittent every 12 hours  vancomycin  IVPB        MEDICATIONS  (PRN):  acetaminophen     Tablet .. 650 milliGRAM(s) Oral every 6 hours PRN Temp greater or equal to 38C (100.4F), Mild Pain (1 - 3)  albuterol/ipratropium for Nebulization 3 milliLiter(s) Nebulizer every 6 hours PRN Shortness of Breath and/or Wheezing  aluminum hydroxide/magnesium hydroxide/simethicone Suspension 30 milliLiter(s) Oral every 4 hours PRN Dyspepsia  melatonin 3 milliGRAM(s) Oral at bedtime PRN Insomnia  ondansetron Injectable 4 milliGRAM(s) IV Push every 8 hours PRN Nausea and/or Vomiting      X-Rays reviewed:

## 2024-05-09 LAB
ALBUMIN SERPL ELPH-MCNC: 3.9 G/DL — SIGNIFICANT CHANGE UP (ref 3.5–5.2)
ALP SERPL-CCNC: 69 U/L — SIGNIFICANT CHANGE UP (ref 30–115)
ALT FLD-CCNC: 33 U/L — SIGNIFICANT CHANGE UP (ref 0–41)
ANION GAP SERPL CALC-SCNC: 9 MMOL/L — SIGNIFICANT CHANGE UP (ref 7–14)
AST SERPL-CCNC: 53 U/L — HIGH (ref 0–41)
BASOPHILS # BLD AUTO: 0.01 K/UL — SIGNIFICANT CHANGE UP (ref 0–0.2)
BASOPHILS NFR BLD AUTO: 0.1 % — SIGNIFICANT CHANGE UP (ref 0–1)
BILIRUB SERPL-MCNC: <0.2 MG/DL — SIGNIFICANT CHANGE UP (ref 0.2–1.2)
BUN SERPL-MCNC: 18 MG/DL — SIGNIFICANT CHANGE UP (ref 10–20)
CALCIUM SERPL-MCNC: 8.9 MG/DL — SIGNIFICANT CHANGE UP (ref 8.4–10.5)
CHLORIDE SERPL-SCNC: 100 MMOL/L — SIGNIFICANT CHANGE UP (ref 98–110)
CO2 SERPL-SCNC: 27 MMOL/L — SIGNIFICANT CHANGE UP (ref 17–32)
CREAT SERPL-MCNC: 0.8 MG/DL — SIGNIFICANT CHANGE UP (ref 0.7–1.5)
EGFR: 88 ML/MIN/1.73M2 — SIGNIFICANT CHANGE UP
EOSINOPHIL # BLD AUTO: 0 K/UL — SIGNIFICANT CHANGE UP (ref 0–0.7)
EOSINOPHIL NFR BLD AUTO: 0 % — SIGNIFICANT CHANGE UP (ref 0–8)
GLUCOSE SERPL-MCNC: 160 MG/DL — HIGH (ref 70–99)
HCT VFR BLD CALC: 37.2 % — SIGNIFICANT CHANGE UP (ref 37–47)
HGB BLD-MCNC: 12.1 G/DL — SIGNIFICANT CHANGE UP (ref 12–16)
IMM GRANULOCYTES NFR BLD AUTO: 0.6 % — HIGH (ref 0.1–0.3)
LEGIONELLA AG UR QL: NEGATIVE — SIGNIFICANT CHANGE UP
LYMPHOCYTES # BLD AUTO: 1.41 K/UL — SIGNIFICANT CHANGE UP (ref 1.2–3.4)
LYMPHOCYTES # BLD AUTO: 9.5 % — LOW (ref 20.5–51.1)
MAGNESIUM SERPL-MCNC: 2.1 MG/DL — SIGNIFICANT CHANGE UP (ref 1.8–2.4)
MCHC RBC-ENTMCNC: 29.4 PG — SIGNIFICANT CHANGE UP (ref 27–31)
MCHC RBC-ENTMCNC: 32.5 G/DL — SIGNIFICANT CHANGE UP (ref 32–37)
MCV RBC AUTO: 90.3 FL — SIGNIFICANT CHANGE UP (ref 81–99)
MONOCYTES # BLD AUTO: 0.53 K/UL — SIGNIFICANT CHANGE UP (ref 0.1–0.6)
MONOCYTES NFR BLD AUTO: 3.6 % — SIGNIFICANT CHANGE UP (ref 1.7–9.3)
NEUTROPHILS # BLD AUTO: 12.75 K/UL — HIGH (ref 1.4–6.5)
NEUTROPHILS NFR BLD AUTO: 86.2 % — HIGH (ref 42.2–75.2)
NRBC # BLD: 0 /100 WBCS — SIGNIFICANT CHANGE UP (ref 0–0)
PLATELET # BLD AUTO: 123 K/UL — LOW (ref 130–400)
PMV BLD: 11.9 FL — HIGH (ref 7.4–10.4)
POTASSIUM SERPL-MCNC: 4.5 MMOL/L — SIGNIFICANT CHANGE UP (ref 3.5–5)
POTASSIUM SERPL-SCNC: 4.5 MMOL/L — SIGNIFICANT CHANGE UP (ref 3.5–5)
PROT SERPL-MCNC: 6.5 G/DL — SIGNIFICANT CHANGE UP (ref 6–8)
RBC # BLD: 4.12 M/UL — LOW (ref 4.2–5.4)
RBC # FLD: 13.8 % — SIGNIFICANT CHANGE UP (ref 11.5–14.5)
S PNEUM AG UR QL: NEGATIVE — SIGNIFICANT CHANGE UP
SODIUM SERPL-SCNC: 136 MMOL/L — SIGNIFICANT CHANGE UP (ref 135–146)
WBC # BLD: 14.79 K/UL — HIGH (ref 4.8–10.8)
WBC # FLD AUTO: 14.79 K/UL — HIGH (ref 4.8–10.8)

## 2024-05-09 PROCEDURE — 99233 SBSQ HOSP IP/OBS HIGH 50: CPT

## 2024-05-09 RX ORDER — AZITHROMYCIN 500 MG/1
500 TABLET, FILM COATED ORAL ONCE
Refills: 0 | Status: COMPLETED | OUTPATIENT
Start: 2024-05-09 | End: 2024-05-09

## 2024-05-09 RX ORDER — ENOXAPARIN SODIUM 100 MG/ML
30 INJECTION SUBCUTANEOUS EVERY 12 HOURS
Refills: 0 | Status: DISCONTINUED | OUTPATIENT
Start: 2024-05-09 | End: 2024-05-10

## 2024-05-09 RX ORDER — AZITHROMYCIN 500 MG/1
TABLET, FILM COATED ORAL
Refills: 0 | Status: DISCONTINUED | OUTPATIENT
Start: 2024-05-09 | End: 2024-05-10

## 2024-05-09 RX ORDER — CEFTRIAXONE 500 MG/1
2000 INJECTION, POWDER, FOR SOLUTION INTRAMUSCULAR; INTRAVENOUS EVERY 24 HOURS
Refills: 0 | Status: DISCONTINUED | OUTPATIENT
Start: 2024-05-10 | End: 2024-05-10

## 2024-05-09 RX ORDER — AZITHROMYCIN 500 MG/1
500 TABLET, FILM COATED ORAL EVERY 24 HOURS
Refills: 0 | Status: DISCONTINUED | OUTPATIENT
Start: 2024-05-10 | End: 2024-05-10

## 2024-05-09 RX ORDER — HEPARIN SODIUM 5000 [USP'U]/ML
5000 INJECTION INTRAVENOUS; SUBCUTANEOUS EVERY 8 HOURS
Refills: 0 | Status: DISCONTINUED | OUTPATIENT
Start: 2024-05-09 | End: 2024-05-09

## 2024-05-09 RX ORDER — METHADONE HYDROCHLORIDE 40 MG/1
120 TABLET ORAL DAILY
Refills: 0 | Status: DISCONTINUED | OUTPATIENT
Start: 2024-05-09 | End: 2024-05-10

## 2024-05-09 RX ORDER — CEFTRIAXONE 500 MG/1
1000 INJECTION, POWDER, FOR SOLUTION INTRAMUSCULAR; INTRAVENOUS ONCE
Refills: 0 | Status: COMPLETED | OUTPATIENT
Start: 2024-05-09 | End: 2024-05-09

## 2024-05-09 RX ADMIN — Medication 2 MILLIGRAM(S): at 06:54

## 2024-05-09 RX ADMIN — Medication 60 MILLIGRAM(S): at 06:12

## 2024-05-09 RX ADMIN — Medication 2 MILLIGRAM(S): at 14:57

## 2024-05-09 RX ADMIN — Medication 2 MILLIGRAM(S): at 19:40

## 2024-05-09 RX ADMIN — HEPARIN SODIUM 5000 UNIT(S): 5000 INJECTION INTRAVENOUS; SUBCUTANEOUS at 06:12

## 2024-05-09 RX ADMIN — CEFTRIAXONE 100 MILLIGRAM(S): 500 INJECTION, POWDER, FOR SOLUTION INTRAMUSCULAR; INTRAVENOUS at 15:02

## 2024-05-09 RX ADMIN — METHADONE HYDROCHLORIDE 120 MILLIGRAM(S): 40 TABLET ORAL at 11:34

## 2024-05-09 RX ADMIN — Medication 40 MILLIGRAM(S): at 11:33

## 2024-05-09 RX ADMIN — CEFEPIME 100 MILLIGRAM(S): 1 INJECTION, POWDER, FOR SOLUTION INTRAMUSCULAR; INTRAVENOUS at 06:08

## 2024-05-09 RX ADMIN — Medication 250 MILLIGRAM(S): at 06:08

## 2024-05-09 RX ADMIN — AZITHROMYCIN 500 MILLIGRAM(S): 500 TABLET, FILM COATED ORAL at 09:43

## 2024-05-09 NOTE — CHART NOTE - NSCHARTNOTEFT_GEN_A_CORE
Confirmed with [facility] (Nurse Vincent) that patient receives monthly dose of 120mg po methadone (last picked up on 4/25).

## 2024-05-09 NOTE — SWALLOW BEDSIDE ASSESSMENT ADULT - SLP PERTINENT HISTORY OF CURRENT PROBLEM
53F w/ PMHx of HTN, hx Opiate dependence on Methadone, and ISSA presents to ED for SOB and Hypoxia. Admitted for Acute Hypoxic respiratory failure 2/2 to CAP.

## 2024-05-09 NOTE — SWALLOW BEDSIDE ASSESSMENT ADULT - COMMENTS
Of note pt reports that   3 weeks ago and there was concern for aspiration during his hospital stay, "which made me think of this". Pt with current PNA. Reports occasional phlegm when eating ice cream.

## 2024-05-10 ENCOUNTER — TRANSCRIPTION ENCOUNTER (OUTPATIENT)
Age: 54
End: 2024-05-10

## 2024-05-10 VITALS — HEIGHT: 67 IN | WEIGHT: 266.98 LBS

## 2024-05-10 LAB
ALBUMIN SERPL ELPH-MCNC: 3.6 G/DL — SIGNIFICANT CHANGE UP (ref 3.5–5.2)
ALP SERPL-CCNC: 62 U/L — SIGNIFICANT CHANGE UP (ref 30–115)
ALT FLD-CCNC: 38 U/L — SIGNIFICANT CHANGE UP (ref 0–41)
ANION GAP SERPL CALC-SCNC: 10 MMOL/L — SIGNIFICANT CHANGE UP (ref 7–14)
AST SERPL-CCNC: 34 U/L — SIGNIFICANT CHANGE UP (ref 0–41)
BASOPHILS # BLD AUTO: 0.01 K/UL — SIGNIFICANT CHANGE UP (ref 0–0.2)
BASOPHILS NFR BLD AUTO: 0.1 % — SIGNIFICANT CHANGE UP (ref 0–1)
BILIRUB SERPL-MCNC: <0.2 MG/DL — SIGNIFICANT CHANGE UP (ref 0.2–1.2)
BUN SERPL-MCNC: 19 MG/DL — SIGNIFICANT CHANGE UP (ref 10–20)
CALCIUM SERPL-MCNC: 8.7 MG/DL — SIGNIFICANT CHANGE UP (ref 8.4–10.4)
CHLORIDE SERPL-SCNC: 103 MMOL/L — SIGNIFICANT CHANGE UP (ref 98–110)
CO2 SERPL-SCNC: 27 MMOL/L — SIGNIFICANT CHANGE UP (ref 17–32)
CREAT SERPL-MCNC: 0.8 MG/DL — SIGNIFICANT CHANGE UP (ref 0.7–1.5)
CULTURE RESULTS: NO GROWTH — SIGNIFICANT CHANGE UP
EGFR: 88 ML/MIN/1.73M2 — SIGNIFICANT CHANGE UP
EOSINOPHIL # BLD AUTO: 0.01 K/UL — SIGNIFICANT CHANGE UP (ref 0–0.7)
EOSINOPHIL NFR BLD AUTO: 0.1 % — SIGNIFICANT CHANGE UP (ref 0–8)
GLUCOSE SERPL-MCNC: 161 MG/DL — HIGH (ref 70–99)
HCT VFR BLD CALC: 37.2 % — SIGNIFICANT CHANGE UP (ref 37–47)
HGB BLD-MCNC: 11.9 G/DL — LOW (ref 12–16)
HIV 1+2 AB+HIV1 P24 AG SERPL QL IA: SIGNIFICANT CHANGE UP
IMM GRANULOCYTES NFR BLD AUTO: 0.5 % — HIGH (ref 0.1–0.3)
LYMPHOCYTES # BLD AUTO: 15.3 % — LOW (ref 20.5–51.1)
LYMPHOCYTES # BLD AUTO: 2.08 K/UL — SIGNIFICANT CHANGE UP (ref 1.2–3.4)
MAGNESIUM SERPL-MCNC: 2.1 MG/DL — SIGNIFICANT CHANGE UP (ref 1.8–2.4)
MCHC RBC-ENTMCNC: 29.3 PG — SIGNIFICANT CHANGE UP (ref 27–31)
MCHC RBC-ENTMCNC: 32 G/DL — SIGNIFICANT CHANGE UP (ref 32–37)
MCV RBC AUTO: 91.6 FL — SIGNIFICANT CHANGE UP (ref 81–99)
MONOCYTES # BLD AUTO: 0.85 K/UL — HIGH (ref 0.1–0.6)
MONOCYTES NFR BLD AUTO: 6.2 % — SIGNIFICANT CHANGE UP (ref 1.7–9.3)
NEUTROPHILS # BLD AUTO: 10.61 K/UL — HIGH (ref 1.4–6.5)
NEUTROPHILS NFR BLD AUTO: 77.8 % — HIGH (ref 42.2–75.2)
NRBC # BLD: 0 /100 WBCS — SIGNIFICANT CHANGE UP (ref 0–0)
PLATELET # BLD AUTO: 122 K/UL — LOW (ref 130–400)
PMV BLD: 11.6 FL — HIGH (ref 7.4–10.4)
POTASSIUM SERPL-MCNC: 4.3 MMOL/L — SIGNIFICANT CHANGE UP (ref 3.5–5)
POTASSIUM SERPL-SCNC: 4.3 MMOL/L — SIGNIFICANT CHANGE UP (ref 3.5–5)
PROT SERPL-MCNC: 6.1 G/DL — SIGNIFICANT CHANGE UP (ref 6–8)
RBC # BLD: 4.06 M/UL — LOW (ref 4.2–5.4)
RBC # FLD: 14 % — SIGNIFICANT CHANGE UP (ref 11.5–14.5)
SODIUM SERPL-SCNC: 140 MMOL/L — SIGNIFICANT CHANGE UP (ref 135–146)
SPECIMEN SOURCE: SIGNIFICANT CHANGE UP
WBC # BLD: 13.63 K/UL — HIGH (ref 4.8–10.8)
WBC # FLD AUTO: 13.63 K/UL — HIGH (ref 4.8–10.8)

## 2024-05-10 PROCEDURE — 99239 HOSP IP/OBS DSCHRG MGMT >30: CPT

## 2024-05-10 RX ORDER — LISINOPRIL 2.5 MG/1
1 TABLET ORAL
Qty: 0 | Refills: 0 | DISCHARGE
Start: 2024-05-10

## 2024-05-10 RX ORDER — ALBUTEROL 90 UG/1
2 AEROSOL, METERED ORAL
Qty: 1 | Refills: 0
Start: 2024-05-10

## 2024-05-10 RX ORDER — ALBUTEROL 90 UG/1
1.25 AEROSOL, METERED ORAL EVERY 4 HOURS
Refills: 0 | Status: DISCONTINUED | OUTPATIENT
Start: 2024-05-10 | End: 2024-05-10

## 2024-05-10 RX ORDER — LISINOPRIL 2.5 MG/1
1 TABLET ORAL
Refills: 0 | DISCHARGE

## 2024-05-10 RX ORDER — TIOTROPIUM BROMIDE AND OLODATEROL 3.124; 2.736 UG/1; UG/1
2 SPRAY, METERED RESPIRATORY (INHALATION)
Qty: 1 | Refills: 0
Start: 2024-05-10

## 2024-05-10 RX ORDER — AZITHROMYCIN 500 MG/1
1 TABLET, FILM COATED ORAL
Qty: 5 | Refills: 0
Start: 2024-05-10 | End: 2024-05-14

## 2024-05-10 RX ORDER — CEFPODOXIME PROXETIL 100 MG
1 TABLET ORAL
Qty: 16 | Refills: 0
Start: 2024-05-10 | End: 2024-05-17

## 2024-05-10 RX ADMIN — METHADONE HYDROCHLORIDE 120 MILLIGRAM(S): 40 TABLET ORAL at 06:08

## 2024-05-10 RX ADMIN — Medication 40 MILLIGRAM(S): at 06:07

## 2024-05-10 RX ADMIN — Medication 2 MILLIGRAM(S): at 06:08

## 2024-05-10 RX ADMIN — AZITHROMYCIN 255 MILLIGRAM(S): 500 TABLET, FILM COATED ORAL at 08:22

## 2024-05-10 RX ADMIN — Medication 2 MILLIGRAM(S): at 14:09

## 2024-05-10 RX ADMIN — CEFTRIAXONE 100 MILLIGRAM(S): 500 INJECTION, POWDER, FOR SOLUTION INTRAMUSCULAR; INTRAVENOUS at 06:07

## 2024-05-10 NOTE — DISCHARGE NOTE PROVIDER - NSDCMRMEDTOKEN_GEN_ALL_CORE_FT
ALPRAZolam 2 mg oral tablet: 1 tab(s) orally 3 times a day as needed for  anxiety  benzonatate 100 mg oral capsule: 1 cap(s) orally every 8 hours  estradiol 0.025 mg/24 hours twice weekly transdermal film, extended release: 1 patch transdermally 2 times a week  guaiFENesin-dextromethorphan 100 mg-10 mg/5 mL oral liquid: 15 milliliter(s) orally every 8 hours as needed for Cough  lisinopril 10 mg oral tablet: 1 tab(s) orally once a day  methadone 10 mg oral tablet: 120 tab(s) orally once a day  saccharomyces boulardii lyo 250 mg oral capsule: 1 cap(s) orally 2 times a day  simvastatin 40 mg oral tablet: 1 tab(s) orally once a day (at bedtime)   albuterol 1.25 mg/3 mL (0.042%) inhalation solution: 2 puff(s) inhaled 2 times a day as needed for  bronchospasm  ALPRAZolam 2 mg oral tablet: 1 tab(s) orally 3 times a day as needed for  anxiety  azithromycin 500 mg oral tablet: 1 tab(s) orally once a day  benzonatate 100 mg oral capsule: 1 cap(s) orally every 8 hours  estradiol 0.025 mg/24 hours twice weekly transdermal film, extended release: 1 patch transdermally 2 times a week  guaiFENesin-dextromethorphan 100 mg-10 mg/5 mL oral liquid: 15 milliliter(s) orally every 8 hours as needed for Cough  lisinopril 10 mg oral tablet: 1 tab(s) orally once a day  methadone 10 mg oral tablet: 120 tab(s) orally once a day  predniSONE 20 mg oral tablet: 2 tab(s) orally once a day  saccharomyces boulardii lyo 250 mg oral capsule: 1 cap(s) orally 2 times a day  simvastatin 40 mg oral tablet: 1 tab(s) orally once a day (at bedtime)  tiotropium-olodaterol 2.5 mcg-2.5 mcg/inh inhalation aerosol: 2 puff(s) inhaled 2 times a day as needed for  bronchospasm   albuterol 1.25 mg/3 mL (0.042%) inhalation solution: 2 puff(s) inhaled 2 times a day as needed for  bronchospasm  ALPRAZolam 2 mg oral tablet: 1 tab(s) orally 3 times a day as needed for  anxiety  cefpodoxime 200 mg oral tablet: 1 tab(s) orally 2 times a day  estradiol 0.025 mg/24 hours twice weekly transdermal film, extended release: 1 patch transdermally 2 times a week  guaiFENesin-dextromethorphan 100 mg-10 mg/5 mL oral liquid: 15 milliliter(s) orally every 8 hours as needed for Cough  methadone 10 mg oral tablet: 120 tab(s) orally once a day  predniSONE 20 mg oral tablet: 2 tab(s) orally once a day  saccharomyces boulardii lyo 250 mg oral capsule: 1 cap(s) orally 2 times a day  simvastatin 40 mg oral tablet: 1 tab(s) orally once a day (at bedtime)  tiotropium-olodaterol 2.5 mcg-2.5 mcg/inh inhalation aerosol: 2 puff(s) inhaled 2 times a day as needed for  bronchospasm

## 2024-05-10 NOTE — DISCHARGE NOTE PROVIDER - HOSPITAL COURSE
53F w/ PMHx of HTN, hx Opiate dependence on Methadone, and ISSA presents to ED for SOB and Hypoxia. At time of my exam patient was lethargic, was found to have elevated CO2 and put on BIPAP. History taken from the chart. Spoke with daughter briefly however was not with her today but said she sounded terrible on the phone and was having fevers and SOB so she called EMS for her to come in.   Of note in 2024 patient was admitted for multilobar PNA and pulmonary nodules with concern for malignancy. Per daughter recently followed up at [facility] and had CT done which was negative except for LLL opacity.   Also per daughter,  just  one week ago.     In the ED,   Vitals: /56, , RR 18, T 98.5, satting 99% on non rebreather   Labs: WBC 12.11, Hgb 15, , Na 138, K 4.2, BUN 7, Cr 1, troponin 118, bnp 99, covid, flu, rsv negative   Imaging: CT PE No CTA evidence of acute pulmonary embolus. Consolidations within the left upper and left lower lobes.     Given Rocephin and AZT and IVF   At time of my exam pt was noted to be lethargic, changed from baseline. FS was 98, BP normotensive. Pupils equal reactive to light. ABG was done and showed pH 7.31, CO2 60, Hco3 30. Patient was placed on bipap.     Impression:  53F w/ PMHx of HTN, hx Opiate dependence on Methadone, and ISSA presents to ED for SOB and Hypoxia.   Admitted for Acute Hypoxic respiratory failure 2/2 to CAP.     #Acute Hypoxic and Hypercapnic respiratory failure 2/2 to CAP, ro post obstructive pna   #COPD exacerbation  - Patient presented with SOB, fevers, and hypoxia, recent admission for pna + IV abx  - Sepsis present on admission (leukocytosis and tachycardia + CT consolidations)   - CTE - Consolidations within the left upper and left lower lobes. Findings can be seen with pneumonia.  - S/p IVF, Rocephin, and Vanc   - ABG: pH 7.31, CO2 60, HCO3 30, placed on BIPAP, now room air   - Patient has 20 pack year hx, smokes vape now   - ID eval : narrow abx to ctx and azithro   - c/w azithro and start augmentin on DC 7 days  - Pulm: Prednisone 40mg 5 days total, Start on stiolto 2.5 2 puffs daily  - speech and swallow : regular  - Bcx : ngtd  - Mrsa nares, urine strep/legionella negative  - Duoneb PRN, C/w BIPAP qhs and PRN  - Aspiration precautions   - need pulm f/u on DC    #Opioid dependance on methadone   - Uses [employer] , last picked up monthly supply on   - methadone 120 QD (confirmed with Ms Kaur at clinic #[phone])  - utox positive for benzo and methadone    #Troponemia - resolved  - ECG sinus tachy no ischemia     #HTN  - Hold BP meds in setting of sepsis   - /76 today  - resume home meds on DC    Diet: Regular  Activity: AAT     Handoff:  - BIPAP QHS and PRN  - c/w abx and po steroid   - monitor BP, resume meds PRN   53F w/ PMHx of HTN, hx Opiate dependence on Methadone, and ISSA presents to ED for SOB and Hypoxia. At time of my exam patient was lethargic, was found to have elevated CO2 and put on BIPAP. History taken from the chart. Spoke with daughter briefly however was not with her today but said she sounded terrible on the phone and was having fevers and SOB so she called EMS for her to come in.   Of note in 2024 patient was admitted for multilobar PNA and pulmonary nodules with concern for malignancy. Per daughter recently followed up at [facility] and had CT done which was negative except for LLL opacity.   Also per daughter,  just  one week ago.     In the ED,   Vitals: /56, , RR 18, T 98.5, satting 99% on non rebreather   Labs: WBC 12.11, Hgb 15, , Na 138, K 4.2, BUN 7, Cr 1, troponin 118, bnp 99, covid, flu, rsv negative   Imaging: CT PE No CTA evidence of acute pulmonary embolus. Consolidations within the left upper and left lower lobes.     Given Rocephin and AZT and IVF   At time of my exam pt was noted to be lethargic, changed from baseline. FS was 98, BP normotensive. Pupils equal reactive to light. ABG was done and showed pH 7.31, CO2 60, Hco3 30. Patient was placed on bipap.     Impression:  53F w/ PMHx of HTN, hx Opiate dependence on Methadone, and ISSA presents to ED for SOB and Hypoxia.   Admitted for Acute Hypoxic respiratory failure 2/2 to CAP.     #Acute Hypoxic and Hypercapnic respiratory failure 2/2 to CAP, ro post obstructive pna   #COPD exacerbation  - Patient presented with SOB, fevers, and hypoxia, recent admission for pna + IV abx  - Sepsis present on admission (leukocytosis and tachycardia + CT consolidations)   - CTE - Consolidations within the left upper and left lower lobes. Findings can be seen with pneumonia.  - S/p IVF, Rocephin, and Vanc   - ABG: pH 7.31, CO2 60, HCO3 30, placed on BIPAP, now room air   - Patient has 20 pack year hx, smokes vape now   - ID eval : narrow abx to ctx and azithro   - c/w azithro and start augmentin on DC 7 days  - Pulm: Prednisone 40mg 5 days total, Start on stiolto 2.5 2 puffs daily  - speech and swallow : regular  - Bcx : ngtd  - Mrsa nares, urine strep/legionella negative  - Duoneb PRN, C/w BIPAP qhs and PRN  - Aspiration precautions   - need pulm f/u on DC  - need repeat CT in 6 weeks    #Opioid dependance on methadone   - Uses [employer] , last picked up monthly supply on   - methadone 120 QD (confirmed with Ms Kaur at clinic #[phone])  - utox positive for benzo and methadone    #Troponemia - resolved  - ECG sinus tachy no ischemia     #HTN  - Hold BP meds in setting of sepsis   - /76 today  - resume home meds on DC    Diet: Regular  Activity: AAT     Handoff:  - BIPAP QHS and PRN  - c/w abx and po steroid   - monitor BP, resume meds PRN   53F w/ PMHx of HTN, hx Opiate dependence on Methadone, and ISSA presents to ED for SOB and Hypoxia. At time of my exam patient was lethargic, was found to have elevated CO2 and put on BIPAP. History taken from the chart. Spoke with daughter briefly however was not with her today but said she sounded terrible on the phone and was having fevers and SOB so she called EMS for her to come in.   Of note in 2024 patient was admitted for multilobar PNA and pulmonary nodules with concern for malignancy. Per daughter recently followed up at [facility] and had CT done which was negative except for LLL opacity.   Also per daughter,  just  3weeks ago.     In the ED,   Vitals: /56, , RR 18, T 98.5, satting 99% on non rebreather   Labs: WBC 12.11, Hgb 15, , Na 138, K 4.2, BUN 7, Cr 1, troponin 118, bnp 99, covid, flu, rsv negative   Imaging: CT PE No CTA evidence of acute pulmonary embolus. Consolidations within the left upper and left lower lobes.     Given Rocephin and AZT and IVF   At time of my exam pt was noted to be lethargic, changed from baseline. FS was 98, BP normotensive. Pupils equal reactive to light. ABG was done and showed pH 7.31, CO2 60, Hco3 30. Patient was placed on bipap.     Impression:  53F w/ PMHx of HTN, hx Opiate dependence on Methadone, and ISSA presents to ED for SOB and Hypoxia.   Admitted for Acute Hypoxic respiratory failure 2/2 to CAP.     #Acute Hypoxic and Hypercapnic respiratory failure 2/2 to CAP, ro post obstructive pna   #COPD exacerbation  - Patient presented with SOB, fevers, and hypoxia, recent admission for pna + IV abx  - Sepsis present on admission (leukocytosis and tachycardia + CT consolidations)   - CTE - Consolidations within the left upper and left lower lobes. Findings can be seen with pneumonia.  - S/p IVF, Rocephin, and Vanc   - ABG: pH 7.31, CO2 60, HCO3 30, placed on BIPAP, now room air   - Patient has 20 pack year hx, smokes vape now   - ID eval : narrow abx to ctx and azithro   - c/w azithro and start augmentin on DC 7 days  - Pulm: Prednisone 40mg 5 days total, Start on stiolto 2.5 2 puffs daily  - speech and swallow : regular  - Bcx : ngtd  - Mrsa nares, urine strep/legionella negative  - Duoneb PRN, C/w BIPAP qhs and PRN  - Aspiration precautions   - need pulm f/u on DC  - need repeat CT in 6 weeks    #Opioid dependance on methadone   - Uses [employer] , last picked up monthly supply on   - methadone 120 QD (confirmed with Ms Kaur at clinic #[phone])  - utox positive for benzo and methadone    #Troponemia - resolved  - ECG sinus tachy no ischemia     #HTN  - Hold BP meds in setting of sepsis   - /76 today  - resume home meds on DC    Diet: Regular  Activity: AAT     Handoff:  - BIPAP QHS and PRN  - c/w abx and po steroid   - monitor BP, resume meds PRN   No

## 2024-05-10 NOTE — DISCHARGE NOTE PROVIDER - CARE PROVIDER_API CALL
Shantell Patel  Internal Medicine  [address] [place] [address]  Phone: [phone]  Fax: [phone]  Follow Up Time: 2 weeks   Shantell Patel  Internal Medicine  [address] [place] [address]  Phone: [phone]  Fax: [phone]  Follow Up Time: 2 weeks    Emanuel Major  Pulmonary Disease  [address] [place] [address], [facility] [address]  [place] [address]  Phone: [phone]  Fax: [phone]  Follow Up Time: 2 weeks   Shantell Patel  Internal Medicine  [address] [place] [address]  Phone: [phone]  Fax: [phone]  Scheduled Appointment: 05/21/2024 02:30 PM    Emanuel Major  Pulmonary Disease  [address] [facility], [facility] [address]  [place] [address]  Phone: [phone]  Fax: [phone]  Follow Up Time: 2 weeks

## 2024-05-10 NOTE — PROGRESS NOTE ADULT - SUBJECTIVE AND OBJECTIVE BOX
CORRIE RODRIGUEZ  53y, Female  Allergy: No Known Allergies      LOS  2d    CHIEF COMPLAINT: SOB + Hypoxia (10 May 2024 10:31)      INTERVAL EVENTS/HPI  - No acute events overnight  - T(F): , Max: 97.8 (05-09-24 @ 20:27)  - Denies any worsening symptoms  - Tolerating medication  - WBC Count: 13.63 (05-10-24 @ 06:00)  WBC Count: 14.79 (05-09-24 @ 05:34)     - Creatinine: 0.8 (05-10-24 @ 06:00)  Creatinine: 0.8 (05-09-24 @ 05:34)       ROS  General: Denies rigors, nightsweats  HEENT: Denies headache, rhinorrhea, sore throat, eye pain  CV: Denies CP, palpitations  PULM: Denies wheezing, hemoptysis  GI: Denies hematemesis, hematochezia, melena  : Denies discharge, hematuria  MSK: Denies arthralgias, myalgias  SKIN: Denies rash, lesions  NEURO: Denies paresthesias, weakness  PSYCH: Denies depression, anxiety    VITALS:  T(F): 97.8, Max: 97.8 (05-09-24 @ 20:27)  HR: 86  BP: 146/87  RR: 18Vital Signs Last 24 Hrs  T(C): 36.6 (10 May 2024 14:31), Max: 36.6 (09 May 2024 20:27)  T(F): 97.8 (10 May 2024 14:31), Max: 97.8 (09 May 2024 20:27)  HR: 86 (10 May 2024 14:31) (61 - 86)  BP: 146/87 (10 May 2024 14:31) (120/79 - 146/87)  BP(mean): 107 (10 May 2024 14:31) (107 - 107)  RR: 18 (10 May 2024 14:31) (18 - 18)  SpO2: 95% (10 May 2024 14:31) (92% - 100%)    Parameters below as of 10 May 2024 14:31  Patient On (Oxygen Delivery Method): nasal cannula  O2 Flow (L/min): 4      PHYSICAL EXAM:  Gen: NAD, resting in bed  HEENT: Normocephalic, atraumatic  Neck: supple, no lymphadenopathy  CV: Regular rate & regular rhythm  Lungs: decreased BS at bases, no fremitus  Abdomen: Soft, BS present  Ext: Warm, well perfused  Neuro: non focal, awake  Skin: no rash, no erythema  Lines: no phlebitis    FH: Non-contributory  Social Hx: Non-contributory    TESTS & MEASUREMENTS:                        11.9   13.63 )-----------( 122      ( 10 May 2024 06:00 )             37.2     05-10    140  |  103  |  19  ----------------------------<  161<H>  4.3   |  27  |  0.8    Ca    8.7      10 May 2024 06:00  Mg     2.1     05-10    TPro  6.1  /  Alb  3.6  /  TBili  <0.2  /  DBili  x   /  AST  34  /  ALT  38  /  AlkPhos  62  05-10      LIVER FUNCTIONS - ( 10 May 2024 06:00 )  Alb: 3.6 g/dL / Pro: 6.1 g/dL / ALK PHOS: 62 U/L / ALT: 38 U/L / AST: 34 U/L / GGT: x           Urinalysis Basic - ( 10 May 2024 06:00 )    Color: x / Appearance: x / SG: x / pH: x  Gluc: 161 mg/dL / Ketone: x  / Bili: x / Urobili: x   Blood: x / Protein: x / Nitrite: x   Leuk Esterase: x / RBC: x / WBC x   Sq Epi: x / Non Sq Epi: x / Bacteria: x        Culture - Urine (collected 05-08-24 @ 11:30)  Source: Catheterized Catheterized  Final Report (05-10-24 @ 03:40):    No growth    Culture - Blood (collected 05-07-24 @ 20:21)  Source: .Blood Blood  Preliminary Report (05-10-24 @ 02:02):    No growth at 48 Hours    Culture - Blood (collected 05-07-24 @ 20:21)  Source: .Blood Blood  Preliminary Report (05-10-24 @ 02:02):    No growth at 48 Hours        Lactate, Blood: 2.4 mmol/L (05-08-24 @ 00:55)  Blood Gas Venous - Lactate: 2.9 mmol/L (05-07-24 @ 20:22)      INFECTIOUS DISEASES TESTING  HIV-1/2 Combo Result: Nonreact (05-09-24 @ 10:42)  Legionella Antigen, Urine: Negative (05-08-24 @ 18:39)  Procalcitonin: 18.80 (05-08-24 @ 06:35)  MRSA PCR Result.: Negative (05-08-24 @ 02:50)  Rapid RVP Result: NotDetec (02-13-24 @ 07:00)  Procalcitonin, Serum: 0.08 (02-11-24 @ 17:00)      INFLAMMATORY MARKERS      RADIOLOGY & ADDITIONAL TESTS:  I have personally reviewed the last available Chest xray  CXR  Xray Chest 1 View- PORTABLE-Urgent:   ACC: [id number] EXAM:  XR CHEST PORTABLE URGENT 1V   ORDERED BY: PAULA SANTANA     PROCEDURE DATE:  05/07/2024          INTERPRETATION:  STUDY INDICATION: Chest Pain    TECHNIQUE:  Portable frontal view of the chest obtained.    COMPARISON: XR CHEST 2/11/2024.    FINDINGS/  IMPRESSION:    Left lung opacities. No pneumothorax.    Stable cardiomediastinal silhouette.    Unchanged osseous structures.    --- End of Report ---            CASTILLO GARCIA MD; Attending Radiologist  This document has been electronically signed. May  8 2024  6:11AM (05-07-24 @ 20:41)      CT  CT Angio Chest PE Protocol w/ IV Cont:   ACC: [id number] EXAM:  CT ANGIO CHEST PULM ART WAWIC   ORDERED BY: PAULA SANTANA     PROCEDURE DATE:  05/07/2024          INTERPRETATION:  CLINICAL HISTORY / REASON FOR EXAM: Shortness of breath    TECHNIQUE: Multislice helical sections were obtained from the thoracic   inlet to the lung bases during rapid administration of following   administration of 65 mL Omnipaque 350 intravenous contrast using a CTA   pulmonary embolism protocol. Thin sections were reconstructed through the   pulmonary vasculature. Coronal, sagittal and 3D/MIP reformatted images   are also submitted.    COMPARISON CT: CTA chest from February 11, 2024    OTHER STUDIES USED FOR CORRELATION: Chest radiograph from May 7, 2024      FINDINGS:    PULMONARY EMBOLUS: No central orsegmental pulmonary embolus.    TUBES/LINES: None.    LUNGS, PLEURA, AIRWAYS: Consolidative opacities within the left upper and   left lower lobe. Bilateral lower lobe peribronchial thickening.   Additional areas of mosaic ground glass attenuation. Previously seen left   lower lobe 1 cm nodule is obscured by consolidations. No pneumothorax.    THORACIC NODES: Multiple enlarged paratracheal, subcarinal and hilar   lymph nodes.    MEDIASTINUM/GREAT VESSELS: No pericardial effusion. Heart size   unremarkable. No aneurysmal dilation of the thoracic aorta.    VISUALIZED UPPER ABDOMEN: Hepatic steatosis.    BONES/SOFT TISSUES: No acute osseous abnormality.      IMPRESSION:    No CTA evidence of acute pulmonary embolus.    Consolidations within the left upper and left lower lobes. Findings can   be seen with pneumonia. Recommend radiographic follow-up after treatment.    --- End of Report ---            JUANITA HENDRICKS MD; Attending Radiologist  This document has been electronically signed. May  7 2024 10:14PM (05-07-24 @ 22:10)      CARDIOLOGY TESTING  12 Lead ECG:   Ventricular Rate 116 BPM    Atrial Rate 116 BPM    P-R Interval 150 ms    QRS Duration 68 ms    Q-T Interval 294 ms    QTC Calculation(Bazett) 408 ms    P Axis 45 degrees    R Axis 60 degrees    T Axis 51 degrees    Diagnosis Line Sinus tachycardia  Otherwise normal ECG    Confirmed by LENNIE LONG, ROSENDO ([id number]) on 5/7/2024 11:07:21 PM (05-07-24 @ 20:33)      MEDICATIONS  albuterol   0.042% 1.25 Nebulizer every 4 hours  azithromycin  IVPB     azithromycin  IVPB 500 IV Intermittent every 24 hours  cefTRIAXone   IVPB 2000 IV Intermittent every 24 hours  enoxaparin Injectable 30 SubCutaneous every 12 hours  influenza   Vaccine 0.5 IntraMuscular once  methadone    Tablet 120 Oral daily  predniSONE   Tablet 40 Oral daily  tiotropium 2.5 MICROgram(s)/olodaterol 2.5 MICROgram(s) (STIOLTO) Inhaler 2 Inhalation daily      WEIGHT  Weight (kg): 121.064 (05-09-24 @ 13:47)  Creatinine: 0.8 mg/dL (05-10-24 @ 06:00)      ANTIBIOTICS:  azithromycin  IVPB      azithromycin  IVPB 500 milliGRAM(s) IV Intermittent every 24 hours  cefTRIAXone   IVPB 2000 milliGRAM(s) IV Intermittent every 24 hours      All available historical records have been reviewed      
Patient is a 53y old  Female who presents with a chief complaint of SOB + Hypoxia (09 May 2024 09:31)    INTERVAL HPI/OVERNIGHT EVENTS: Patient was examined and seen at bedside. This morning pt is resting comfortably in bed and reports no new issues or overnight events. No new complaints, feels better. Ambulating. No need for O2. No cough.    ROS: Denies CP, SOB, AP  All other systems reviewed and are within normal limits.  InitialHPI:  53F w/ PMHx of HTN, hx Opiate dependence on Methadone, and ISSA presents to ED for SOB and Hypoxia. At time of ED exam patient was lethargic, was found to have elevated CO2 and put on BIPAP. History taken from the chart. Spoke with daughter briefly however was not with her today but said she sounded terrible on the phone and was having ???fevers and SOB so she called EMS for her to come in.   Of note in 2024 patient was admitted for multilobar PNA and pulmonary nodules with concern for malignancy. Per daughter recently had CT done which was negative except for LLL opacity.   Also per daughter,  just  one week ago.     In the ED,   Vitals: /56, , RR 18, T 98.5, satting 99% on non rebreather   Labs: WBC 12.11, Hgb 15, , Na 138, K 4.2, BUN 7, Cr 1, troponin 118, bnp 99, covid, flu, rsv negative   Imaging: CT PE No CTA evidence of acute pulmonary embolus. Consolidations within the left upper and left lower lobes.     Given Rocephin and AZT and IVF   At time of my exam pt was noted to be lethargic, changed from baseline. FS was 98, BP normotensive. Pupils equal reactive to light. ABG was done and showed pH 7.31, CO2 60, Hco3 30. Patient was placed on bipap.      (08 May 2024 01:52)    PAST MEDICAL & SURGICAL HISTORY:  Benign essential HTN      Opiate dependence      H/O tubal ligation          General: NAD, AAO3  HEENT:  EOMI, no LAD  CV: S1 S2  Resp: decreased breath sounds at bases  GI: NT/ND/S +BS  MS: no clubbing/cyanosis/edema, + pulses b/l  Neuro: nonfocal, +reflexes thruout          Home Medications:  ALPRAZolam 2 mg oral tablet: 1 tab(s) orally 3 times a day as needed for  anxiety (2024 17:11)  estradiol 0.025 mg/24 hours twice weekly transdermal film, extended release: 1 patch transdermally 2 times a week (15 Feb 2024 12:35)  lisinopril 10 mg oral tablet: 1 tab(s) orally once a day (10 May 2024 15:58)  methadone 10 mg oral tablet: 120 tab(s) orally once a day (2024 17:03)  simvastatin 40 mg oral tablet: 1 tab(s) orally once a day (at bedtime) (2024 23:25)    MEDICATIONS  (STANDING):  albuterol   0.042% 1.25 milliGRAM(s) Nebulizer every 4 hours  azithromycin  IVPB      azithromycin  IVPB 500 milliGRAM(s) IV Intermittent every 24 hours  cefTRIAXone   IVPB 2000 milliGRAM(s) IV Intermittent every 24 hours  enoxaparin Injectable 30 milliGRAM(s) SubCutaneous every 12 hours  influenza   Vaccine 0.5 milliLiter(s) IntraMuscular once  methadone    Tablet 120 milliGRAM(s) Oral daily  predniSONE   Tablet 40 milliGRAM(s) Oral daily  tiotropium 2.5 MICROgram(s)/olodaterol 2.5 MICROgram(s) (STIOLTO) Inhaler 2 Puff(s) Inhalation daily    MEDICATIONS  (PRN):  acetaminophen     Tablet .. 650 milliGRAM(s) Oral every 6 hours PRN Temp greater or equal to 38C (100.4F), Mild Pain (1 - 3)  ALPRAZolam 2 milliGRAM(s) Oral three times a day PRN Anxiety  aluminum hydroxide/magnesium hydroxide/simethicone Suspension 30 milliLiter(s) Oral every 4 hours PRN Dyspepsia  melatonin 3 milliGRAM(s) Oral at bedtime PRN Insomnia  ondansetron Injectable 4 milliGRAM(s) IV Push every 8 hours PRN Nausea and/or Vomiting    Vital Signs Last 24 Hrs  T(C): 36.6 (10 May 2024 14:31), Max: 36.6 (09 May 2024 20:27)  T(F): 97.8 (10 May 2024 14:31), Max: 97.8 (09 May 2024 20:27)  HR: 86 (10 May 2024 14:31) (61 - 86)  BP: 146/87 (10 May 2024 14:31) (120/79 - 146/87)  BP(mean): 107 (10 May 2024 14:31) (107 - 107)  RR: 18 (10 May 2024 14:31) (18 - 18)  SpO2: 95% (10 May 2024 14:31) (92% - 100%)    Parameters below as of 10 May 2024 14:31  Patient On (Oxygen Delivery Method): nasal cannula  O2 Flow (L/min): 4    CAPILLARY BLOOD GLUCOSE        LABS:                        11.9   13.63 )-----------( 122      ( 10 May 2024 06:00 )             37.2     05-10    140  |  103  |  19  ----------------------------<  161<H>  4.3   |  27  |  0.8    Ca    8.7      10 May 2024 06:00  Mg     2.1     05-10    TPro  6.1  /  Alb  3.6  /  TBili  <0.2  /  DBili  x   /  AST  34  /  ALT  38  /  AlkPhos  62  05-10    LIVER FUNCTIONS - ( 10 May 2024 06:00 )  Alb: 3.6 g/dL / Pro: 6.1 g/dL / ALK PHOS: 62 U/L / ALT: 38 U/L / AST: 34 U/L / GGT: x                 Urinalysis Basic - ( 10 May 2024 06:00 )    Color: x / Appearance: x / SG: x / pH: x  Gluc: 161 mg/dL / Ketone: x  / Bili: x / Urobili: x   Blood: x / Protein: x / Nitrite: x   Leuk Esterase: x / RBC: x / WBC x   Sq Epi: x / Non Sq Epi: x / Bacteria: x              Culture - Urine (collected 08 May 2024 11:30)  Source: Catheterized Catheterized  Final Report (10 May 2024 03:40):    No growth    Culture - Blood (collected 07 May 2024 20:21)  Source: .Blood Blood  Preliminary Report (10 May 2024 02:02):    No growth at 48 Hours    Culture - Blood (collected 07 May 2024 20:21)  Source: .Blood Blood  Preliminary Report (10 May 2024 02:02):    No growth at 48 Hours      Consultant Notes Reviewed:  [x ] YES  [ ] NO  Care Discussed with Consultants/Other Providers/ Housestaff [ x] YES  [ ] NO  Radiology, labs, EKGs, new studies personally reviewed.                                < from: CT Angio Chest PE Protocol w/ IV Cont (24 @ 22:10) >  No CTA evidence of acute pulmonary embolus.    Consolidations within the left upper and left lower lobes. Findings can   be seen with pneumonia. Recommend radiographic follow-up after treatment.    < end of copied text >    < from: TTE Echo Complete w/o Contrast w/ Doppler (24 @ 09:12) >   1. LV Ejection Fraction by Wilkerson's Method with a biplane EF of 66 %.   2. Mildly enlarged left atrium.    < end of copied text >                          
24H events:    Patient is a 53y old Female who presents with a chief complaint of SOB + Hypoxia (08 May 2024 16:38)    Primary diagnosis of Acute respiratory failure with hypoxia      Today is hospital day 1d. This morning patient was seen and examined at bedside, resting comfortably in bed.    No acute or major events overnight.    Code Status: full      PAST MEDICAL & SURGICAL HISTORY  Benign essential HTN    Opiate dependence    H/O tubal ligation      SOCIAL HISTORY:  Social History:      ALLERGIES:  No Known Allergies    MEDICATIONS:  STANDING MEDICATIONS  azithromycin  IVPB 500 milliGRAM(s) IV Intermittent once  azithromycin  IVPB      heparin   Injectable 5000 Unit(s) SubCutaneous every 12 hours  influenza   Vaccine 0.5 milliLiter(s) IntraMuscular once  methylPREDNISolone sodium succinate Injectable 60 milliGRAM(s) IV Push two times a day  tiotropium 2.5 MICROgram(s)/olodaterol 2.5 MICROgram(s) (STIOLTO) Inhaler 2 Puff(s) Inhalation daily    PRN MEDICATIONS  acetaminophen     Tablet .. 650 milliGRAM(s) Oral every 6 hours PRN  albuterol/ipratropium for Nebulization 3 milliLiter(s) Nebulizer every 6 hours PRN  ALPRAZolam 2 milliGRAM(s) Oral three times a day PRN  aluminum hydroxide/magnesium hydroxide/simethicone Suspension 30 milliLiter(s) Oral every 4 hours PRN  melatonin 3 milliGRAM(s) Oral at bedtime PRN  ondansetron Injectable 4 milliGRAM(s) IV Push every 8 hours PRN    VITALS:   T(F): 98  HR: 88  BP: 132/76  RR: 18  SpO2: 95%    PHYSICAL EXAM:  GENERAL:   ( + ) NAD, lying in bed comfortably     (  ) obtunded     (  ) lethargic     (  ) somnolent    HEAD:   ( + ) Atraumatic     (  ) hematoma     (  ) laceration (specify location:       )     NECK:  ( + ) Supple     (  ) neck stiffness     (  ) nuchal rigidity     (  )  no JVD     (  ) JVD present ( -- cm)    HEART:  Rate -->     ( + ) normal rate     (  ) bradycardic     (  ) tachycardic  Rhythm -->     ( + ) regular     (  ) regularly irregular     (  ) irregularly irregular  Murmurs -->     (  ) normal s1s2     (  ) systolic murmur     (  ) diastolic murmur     (  ) continuous murmur      (  ) S3 present     (  ) S4 present    LUNGS:   ( + )Unlabored respirations     (  ) tachypnea  ( + ) B/L air entry     (  ) decreased breath sounds in:  (location     )    (  ) no adventitious sound     (  ) crackles     (  ) wheezing      (  ) rhonchi      (specify location:       )  (  ) chest wall tenderness (specify location:       )    ABDOMEN:   ( + ) Soft     (  ) tense   |   (  ) nondistended     (  ) distended   |   (  ) +BS     (  ) hypoactive bowel sounds     (  ) hyperactive bowel sounds  (  ) nontender     (  ) RUQ tenderness     (  ) RLQ tenderness     (  ) LLQ tenderness     (  ) epigastric tenderness     (  ) diffuse tenderness  (  ) Splenomegaly      (  ) Hepatomegaly      (  ) Jaundice     (  ) ecchymosis     EXTREMITIES: 2+ peripheral pulses bilaterally. No clubbing, cyanosis, or edema  ( + ) Normal     (  ) Rash     (  ) ecchymosis     (  ) varicose veins      (  ) pitting edema     (  ) non-pitting edema   (  ) ulceration     (  ) gangrene:     (location:     )    NERVOUS SYSTEM:    ( + ) A&Ox3     (  ) confused     (  ) lethargic  CN II-XII:     (  ) Intact     (  ) deficits found     (Specify:     )   Upper extremities:     (  ) no sensorimotor deficits     (  ) weakness     (  ) loss of proprioception/vibration     (  ) loss of touch/temperature (specify:    )  Lower extremities:     (  ) no sensorimotor deficits     (  ) weakness     (  ) loss of proprioception/vibration     (  ) loss of touch/temperature (specify:    )    SKIN:   ( + ) No rashes or lesions     (  ) maculopapular rash     (  ) pustules     (  ) vesicles     (  ) ulcer     (  ) ecchymosis     (specify location:     )    AMPAC score:    ( - ) Indwelling Flanagan Catheter:   Date insterted:    Reason (  ) Critical illness     (  ) urinary retention    (  ) Accurate Ins/Outs Monitoring     (  ) CMO patient    ( - ) Central Line:   Date inserted:  Location: (  ) Right IJ     (  ) Left IJ     (  ) Right Fem     (  ) Left Fem    (  ) SPC        (  ) pigtail       (  ) PEG tube       (  ) colostomy       (  ) jejunostomy  (  ) U-Dall    LABS:                        12.1   14.79 )-----------( 123      ( 09 May 2024 05:34 )             37.2     05-09    136  |  100  |  18  ----------------------------<  160<H>  4.5   |  27  |  0.8    Ca    8.9      09 May 2024 05:34  Mg     2.1     05-09    TPro  6.5  /  Alb  3.9  /  TBili  <0.2  /  DBili  x   /  AST  53<H>  /  ALT  33  /  AlkPhos  69  05-09    PT/INR - ( 07 May 2024 20:21 )   PT: 13.10 sec;   INR: 1.15 ratio         PTT - ( 07 May 2024 20:21 )  PTT:30.1 sec  Urinalysis Basic - ( 09 May 2024 05:34 )    Color: x / Appearance: x / SG: x / pH: x  Gluc: 160 mg/dL / Ketone: x  / Bili: x / Urobili: x   Blood: x / Protein: x / Nitrite: x   Leuk Esterase: x / RBC: x / WBC x   Sq Epi: x / Non Sq Epi: x / Bacteria: x      ABG - ( 08 May 2024 01:20 )  pH, Arterial: 7.31  pH, Blood: x     /  pCO2: 60    /  pO2: 65    / HCO3: 30    / Base Excess: 2.5   /  SaO2: 92.8                  Culture - Blood (collected 07 May 2024 20:21)  Source: .Blood Blood  Preliminary Report (09 May 2024 02:02):    No growth at 24 hours    Culture - Blood (collected 07 May 2024 20:21)  Source: .Blood Blood  Preliminary Report (09 May 2024 02:02):    No growth at 24 hours          RADIOLOGY:          
Patient is a 53y old  Female who presents with a chief complaint of SOB + Hypoxia (09 May 2024 09:31)    INTERVAL HPI/OVERNIGHT EVENTS: Patient was examined and seen at bedside. This morning pt is resting comfortably in bed and reports no new issues or overnight events. No new complaints, feels better. Asking about her Methadone. Suboptimal historian.  ROS: Denies CP, SOB, AP  All other systems reviewed and are within normal limits.  InitialHPI:  53F w/ PMHx of HTN, hx Opiate dependence on Methadone, and ISSA presents to ED for SOB and Hypoxia. At time of ED exam patient was lethargic, was found to have elevated CO2 and put on BIPAP. History taken from the chart. Spoke with daughter briefly however was not with her today but said she sounded terrible on the phone and was having ???fevers and SOB so she called EMS for her to come in.   Of note in 2024 patient was admitted for multilobar PNA and pulmonary nodules with concern for malignancy. Per daughter recently had CT done which was negative except for LLL opacity.   Also per daughter,  just  one week ago.     In the ED,   Vitals: /56, , RR 18, T 98.5, satting 99% on non rebreather   Labs: WBC 12.11, Hgb 15, , Na 138, K 4.2, BUN 7, Cr 1, troponin 118, bnp 99, covid, flu, rsv negative   Imaging: CT PE No CTA evidence of acute pulmonary embolus. Consolidations within the left upper and left lower lobes.     Given Rocephin and AZT and IVF   At time of my exam pt was noted to be lethargic, changed from baseline. FS was 98, BP normotensive. Pupils equal reactive to light. ABG was done and showed pH 7.31, CO2 60, Hco3 30. Patient was placed on bipap.      (08 May 2024 01:52)    PAST MEDICAL & SURGICAL HISTORY:  Benign essential HTN      Opiate dependence      H/O tubal ligation          General: NAD, AAO3, needs redirecting at times  HEENT:  EOMI, no LAD  CV: S1 S2  Resp: decreased breath sounds at bases  GI: NT/ND/S +BS  MS: no clubbing/cyanosis/edema, + pulses b/l  Neuro: nonfocal, +reflexes thruout    MEDICATIONS  (STANDING):  azithromycin  IVPB      cefTRIAXone   IVPB 1000 milliGRAM(s) IV Intermittent once  heparin   Injectable 5000 Unit(s) SubCutaneous every 8 hours  influenza   Vaccine 0.5 milliLiter(s) IntraMuscular once  methadone    Tablet 120 milliGRAM(s) Oral daily  predniSONE   Tablet 40 milliGRAM(s) Oral daily  tiotropium 2.5 MICROgram(s)/olodaterol 2.5 MICROgram(s) (STIOLTO) Inhaler 2 Puff(s) Inhalation daily    MEDICATIONS  (PRN):  acetaminophen     Tablet .. 650 milliGRAM(s) Oral every 6 hours PRN Temp greater or equal to 38C (100.4F), Mild Pain (1 - 3)  albuterol/ipratropium for Nebulization 3 milliLiter(s) Nebulizer every 6 hours PRN Shortness of Breath and/or Wheezing  ALPRAZolam 2 milliGRAM(s) Oral three times a day PRN Anxiety  aluminum hydroxide/magnesium hydroxide/simethicone Suspension 30 milliLiter(s) Oral every 4 hours PRN Dyspepsia  melatonin 3 milliGRAM(s) Oral at bedtime PRN Insomnia  ondansetron Injectable 4 milliGRAM(s) IV Push every 8 hours PRN Nausea and/or Vomiting    Vital Signs Last 24 Hrs  T(C): 36.4 (09 May 2024 12:52), Max: 36.7 (09 May 2024 04:53)  T(F): 97.5 (09 May 2024 12:52), Max: 98 (09 May 2024 04:53)  HR: 83 (09 May 2024 12:52) (83 - 89)  BP: 134/64 (09 May 2024 12:52) (118/73 - 134/64)  BP(mean): 88 (08 May 2024 18:38) (88 - 88)  RR: 18 (09 May 2024 12:52) (16 - 18)  SpO2: 95% (08 May 2024 20:59) (95% - 97%)    Parameters below as of 08 May 2024 20:59  Patient On (Oxygen Delivery Method): room air      CAPILLARY BLOOD GLUCOSE                              12.1   14.79 )-----------( 123      ( 09 May 2024 05:34 )             37.2     05-09    136  |  100  |  18  ----------------------------<  160<H>  4.5   |  27  |  0.8    Ca    8.9      09 May 2024 05:34  Mg     2.1     -    TPro  6.5  /  Alb  3.9  /  TBili  <0.2  /  DBili  x   /  AST  53<H>  /  ALT  33  /  AlkPhos  69  05-09    LIVER FUNCTIONS - ( 09 May 2024 05:34 )  Alb: 3.9 g/dL / Pro: 6.5 g/dL / ALK PHOS: 69 U/L / ALT: 33 U/L / AST: 53 U/L / GGT: x               PT/INR - ( 07 May 2024 20:21 )   PT: 13.10 sec;   INR: 1.15 ratio         PTT - ( 07 May 2024 20:21 )  PTT:30.1 sec  Urinalysis Basic - ( 09 May 2024 05:34 )    Color: x / Appearance: x / SG: x / pH: x  Gluc: 160 mg/dL / Ketone: x  / Bili: x / Urobili: x   Blood: x / Protein: x / Nitrite: x   Leuk Esterase: x / RBC: x / WBC x   Sq Epi: x / Non Sq Epi: x / Bacteria: x      ABG - ( 08 May 2024 01:20 )  pH, Arterial: 7.31  pH, Blood: x     /  pCO2: 60    /  pO2: 65    / HCO3: 30    / Base Excess: 2.5   /  SaO2: 92.8                    Culture - Blood (collected 07 May 2024 20:21)  Source: .Blood Blood  Preliminary Report (09 May 2024 02:02):    No growth at 24 hours    Culture - Blood (collected 07 May 2024 20:21)  Source: .Blood Blood  Preliminary Report (09 May 2024 02:02):    No growth at 24 hours      Chart, Consultant(s) Notes Reviewed:  [x ] YES  [ ] NO  Care Discussed with Consultants/Other Providers/ Housestaff [ x] YES  [ ] NO  Radiology, labs, old available records personally reviewed.    < from: CT Angio Chest PE Protocol w/ IV Cont (24 @ 22:10) >  No CTA evidence of acute pulmonary embolus.    Consolidations within the left upper and left lower lobes. Findings can   be seen with pneumonia. Recommend radiographic follow-up after treatment.    < end of copied text >    < from: TTE Echo Complete w/o Contrast w/ Doppler (24 @ 09:12) >   1. LV Ejection Fraction by Wilkerson's Method with a biplane EF of 66 %.   2. Mildly enlarged left atrium.    < end of copied text >

## 2024-05-10 NOTE — DISCHARGE NOTE PROVIDER - NSDCFUSCHEDAPPT_GEN_ALL_CORE_FT
Shantell Patel  [facility] PreAdmits  Scheduled Appointment: 05/21/2024    Shantell Patel  [facility]  [address]  Scheduled Appointment: 05/21/2024

## 2024-05-10 NOTE — DISCHARGE NOTE PROVIDER - CARE PROVIDERS DIRECT ADDRESSES
,[email or web address]

## 2024-05-10 NOTE — DISCHARGE NOTE PROVIDER - NSDCCPCAREPLAN_GEN_ALL_CORE_FT
PRINCIPAL DISCHARGE DIAGNOSIS  Diagnosis: Acute respiratory failure with hypoxia  Assessment and Plan of Treatment:   Please take your medications as directed. Don’t skip doses. Follow up with your primary care physician within 3 days. Continue taking your antibiotics as directed until they are all gone—even if you start to feel better. This will prevent the pneumonia from reoccuring. Coughing up mucus is normal. Don’t use medicines to suppress your cough unless your cough is dry, painful, or interferes with your sleep. Get plenty of rest until your fever, shortness of breath, and chest pain go away. Plan to get a flu shot every year. Ask your primary care doctor about pneumonia vaccines.  Seek immediate medical attention if you experience chest pain, trouble breathing, blue lips or fingernails, fever of 100.4°F  (38°C) or higher, yellow, green, bloody, or smelly sputum, more than normal mucus production, vomiting or diarrhea.        SECONDARY DISCHARGE DIAGNOSES  Diagnosis: Pneumonia  Assessment and Plan of Treatment:     Diagnosis: Elevated troponin  Assessment and Plan of Treatment:      PRINCIPAL DISCHARGE DIAGNOSIS  Diagnosis: Pneumonia  Assessment and Plan of Treatment: Please take your medications as directed. Don’t skip doses. Follow up with your primary care physician. Continue taking your antibiotics as directed until they are all gone—even if you start to feel better. This will prevent the pneumonia from reoccuring. Coughing up mucus is normal. Don’t use medicines to suppress your cough unless your cough is dry, painful, or interferes with your sleep. Get plenty of rest until your fever, shortness of breath, and chest pain go away. Plan to get a flu shot every year. Ask your primary care doctor about pneumonia vaccines.  Stop smoking/vaping!!!  Follow up with pulmonologist for sleep study, pulmonary function tests. We strongly recommend to lower the doses of your Methadone and Xanax.  Seek immediate medical attention if you experience chest pain, trouble breathing, blue lips or fingernails, fever of 100.4°F  (38°C) or higher, yellow, green, bloody, or smelly sputum, more than normal mucus production, vomiting or diarrhea.     PRINCIPAL DISCHARGE DIAGNOSIS  Diagnosis: Pneumonia  Assessment and Plan of Treatment: Please take your medications as directed. Don’t skip doses. Follow up with your primary care physician. Continue taking your antibiotics as directed until they are all gone—even if you start to feel better. This will prevent the pneumonia from reoccuring. Coughing up mucus is normal. Don’t use medicines to suppress your cough unless your cough is dry, painful, or interferes with your sleep. Get plenty of rest until your fever, shortness of breath, and chest pain go away. Plan to get a flu shot every year. Ask your primary care doctor about pneumonia vaccines.  Stop smoking/vaping!!!  Follow up with pulmonologist for sleep study, pulmonary function tests. We strongly recommend to lower the doses of your Methadone and Xanax.  Repeat CT chest inb 6weeks.  Seek immediate medical attention if you experience chest pain, trouble breathing, blue lips or fingernails, fever of 100.4°F  (38°C) or higher, yellow, green, bloody, or smelly sputum, more than normal mucus production, vomiting or diarrhea.

## 2024-05-10 NOTE — DISCHARGE NOTE PROVIDER - PROVIDER TOKENS
PROVIDER:[TOKEN:[[id number]],FOLLOWUP:[2 weeks]] PROVIDER:[TOKEN:[[id number]],FOLLOWUP:[2 weeks]],PROVIDER:[TOKEN:[[id number]],FOLLOWUP:[2 weeks]] PROVIDER:[TOKEN:[[id number]],SCHEDULEDAPPT:[05/21/2024],SCHEDULEDAPPTTIME:[02:30 PM]],PROVIDER:[TOKEN:[[id number]],FOLLOWUP:[2 weeks]]

## 2024-05-10 NOTE — PROGRESS NOTE ADULT - ASSESSMENT
53F w/ PMHx of HTN, hx Opiate dependence on Methadone, and ISSA presents to ED for SOB and Hypoxia. Admitted for Acute Hypoxic respiratory failure 2/2 to CAP.     Daughter is going to bring in medications in AM, please confirm in AM. She does not have medications with her currently, [facility].     #Acute Hypoxic and Hypercapnic respiratory failure 2/2 to CAP, ro post obstructive pna   #COPD exacerbation  - Patient presented with SOB, fevers, and hypoxia , recent admission for pna + IV abx  - Sepsis present on admission (leukocytosis and tachycardia + CT consolidations)   - CTE - Consolidations within the left upper and left lower lobes. Findings can be seen with pneumonia.  - S/p IVF, Rocephin, and Vanc   - ABG: pH 7.31, CO2 60, HCO3 30, placed on BIPAP, now room air   - Patient has 20 pack year hx, smokes vape now   - ID eval : narrow abx to ctx and azithro   - Pulm: Prednisone 40mg 5 days total, Start on stiolto 2,5 2 puffs daily, augmentin on DC 7 days  - consider speech and swallow   - Fu Bcx   - Mrsa nares, urine strep, legionella negative, procal 18.8   - Duoneb PRN, C/w BIPAP qhs and PRN  - Aspiration precautions   -outpt CT chest in 6wks    #Opioid dependance on methadone   - Uses [facility] , last picked up monthly supply on 4/25  - methadone 120 QD (confirmed with Ms Kaur at clinic #[phone])  - utox positive for benzo and methadone  - counselled pt to d/w her Methadone and Xanax providers due to a high risk for hypercapneic resp failure luis angel in the setting of suspected ESSIE    #Troponemia   - Troponin 118 > 60, BNP 99  - ECG sinus tachy no ischemia     #HTN  - monitor    #Suspected ESSIE  outpt sleep study    #Nutrition/Fluids/Electrolytes   - replete K<4 and Mg <2  - ensure regular BMs    #DVT Px  SCDs  Lovenox 30 BID    Dispo: will need outpt referral to [facility]    #Progress Note Handoff  Pending: Clinical improvement and stability__x___  Pt/Family discussion: Pt/daughter informed and agree with the current plan  Disposition: Home__    My note supersedes the residents note should a discrepancy arise.    Chart and notes personally reviewed.  Care Discussed with Consultants/Other Providers/ Housestaff [ x] YES [ ] NO   Radiology, labs, old records personally reviewed.    discussed w/ housestaff, nursing, case management, daughter    Time-based billing (NON-critical care).     50 minutes spent on total encounter. The necessity of the time spent during the encounter on this date of service was due to:     time spent on review of labs, imaging studies, old records, obtaining history, personally examining patient, counselling and communicating with patient/ family, entering orders for medications/tests/etc, discussions with other health care providers, documentation in electronic health records, independent interpretation of labs, imaging/procedure results and care coordination.  
53F w/ PMHx of HTN, hx Opiate dependence on Methadone, and ISSA presents to ED for SOB and Hypoxia. Admitted for Acute Hypoxic respiratory failure 2/2 to CAP.     Daughter is going to bring in medications in AM, please confirm in AM. She does not have medications with her currently, needs to go to [facility] to get them.     #Acute Hypoxic and Hypercapnic respiratory failure 2/2 to CAP, ro post obstructive pna   #COPD exacerbation  - Patient presented with SOB, fevers, and hypoxia , recent admission for pna + IV abx  - Sepsis present on admission (leukocytosis and tachycardia + CT consolidations)   - CTE - Consolidations within the left upper and left lower lobes. Findings can be seen with pneumonia.  - S/p IVF, Rocephin, and Vanc   - ABG: pH 7.31, CO2 60, HCO3 30, placed on BIPAP, now room air   - Patient has 20 pack year hx, smokes vape now   - ID eval : narrow abx to ctx and azithro   - Pulm: Prednisone 40mg 5 days total, Start on stiolto 2,5 2 puffs daily,   - Mrsa nares, urine strep, legionella negative, procal 18.8   - Duoneb PRN, C/w BIPAP qhs and PRN  - Aspiration precautions   -outpt CT chest in 6wks  - PO Vantin on d/c as per pulm  smoking cessation d/w pt  pt is doing well clinically    #Opioid dependance on methadone   - Uses [facility] , last picked up monthly supply on 4/25  - methadone 120 QD (confirmed with Ms Kaur at clinic #[phone])  - utox positive for benzo and methadone  - counselled pt to d/w her Methadone and Xanax providers due to a high risk for hypercapneic resp failure luis angel in the setting of suspected ESSIE    #Troponemia   - Troponin 118 > 60, BNP 99  - ECG sinus tachy no ischemia   outpt cardio f/u    #HTN  - monitor    #Suspected ESSIE  outpt sleep study    #Nutrition/Fluids/Electrolytes   - replete K<4 and Mg <2  - ensure regular BMs    Discharge instructions discussed and patient knows when to seek immediate medical attention.  Patient has proper follow up.  All results discussed and patient aware they require further follow up/work up.  Stressed importance of proper follow up.  Medications prescribed and changes discussed.  All questions and concerns from patient and family addressed. Understanding of instructions verbalized.    My note supersedes the residents note should a discrepancy arise.    Chart and notes personally reviewed.  Care Discussed with Consultants/Other Providers/ Housestaff [ x] YES [ ] NO   Radiology, labs, old records personally reviewed.    discussed w/ housestaff, nursing, case management    Time-based billing (NON-critical care).     35 minutes spent on total encounter. The necessity of the time spent during the encounter on this date of service was due to:     time spent on review of labs, imaging studies, old records, obtaining history, personally examining patient, counselling and communicating with patient/ family, entering orders for medications/tests/etc, discussions with other health care providers, documentation in electronic health records, independent interpretation of labs, imaging/procedure results and care coordination.  
53F w/ PMHx of HTN, hx Opiate dependence on Methadone, and ISSA presents to ED for SOB and Hypoxia. Admitted for Acute Hypoxic respiratory failure 2/2 to CAP.     Daughter is going to bring in medications in AM, please confirm in AM. She does not have medications with her currently, needs to go to [facility] to get them.     #Acute Hypoxic and Hypercapnic respiratory failure 2/2 to CAP, ro post obstructive pna   #COPD exacerbation  - Patient presented with SOB, fevers, and hypoxia , recent admission for pna + IV abx  - Sepsis present on admission (leukocytosis and tachycardia + CT consolidations)   - CTE - Consolidations within the left upper and left lower lobes. Findings can be seen with pneumonia.  - S/p IVF, Rocephin, and Vanc   - ABG: pH 7.31, CO2 60, HCO3 30, placed on BIPAP, now room air   - Patient has 20 pack year hx, smokes vape now   - ID eval : narrow abx to ctx and azithro   - Pulm: Prednisone 40mg 5 days total, Start on stiolto 2,5 2 puffs daily, augmentin on DC 7 days  - consider speech and swallow   - Fu Bcx   - Mrsa nares, urine strep negative, procal 18.8  f/u legionella  - Duoneb PRN, C/w BIPAP qhs and PRN  - Aspiration precautions     #Opioid dependance on methadone   - Uses [facility] , last picked up monthly supply on 4/25  - methadone 120 QD (confirmed with Ms Kaur at clinic #[phone])  - utox positive for benzo and methadone    #Troponemia   - Troponin 118 > 60, BNP 99  - ECG sinus tachy no ischemia     #HTN  - Hold BP meds in setting of sepsis   - /76 today    Misc   DVT: Heparin   GI: NI   Diet: Regular  Activity: AAT     Handoff:  - med rec from daughter  - f/u bx , ur legionella  - BIPAP QHS and PRN  - c/w abx and po steroid   - monitor BP, resume meds PRN
ASSESSMENT  53F w/ PMHx of HTN, hx Opiate dependence on Methadone, and ISSA presents to ED for SOB and Hypoxia.    IMPRESSION  #JEANNE/LLL pneumonia -   -  CT Angio Chest PE Protocol w/ IV Cont (02.11.24 @ 17:54): Bilateral lower lobe opacities and pulmonary nodules which may be  secondary to infectious/inflammatory etiologies. Neoplastic process is   not excluded. Nonspecific pretracheal 1.7 cm lymph node and other prominent mediastinal  lymph nodes. Additionally, and large periaortic and portacaval lymph  nodes as above, nonspecific. Follow-up per oncologic protocol. No central pulmonary emboli or right heart strain.  - CT Angio Chest PE Protocol w/ IV Cont (05.07.24 @ 22:10): No CTA evidence of acute pulmonary embolus. Consolidations within the left upper and left lower lobes. Findings can  be seen with pneumonia. Recommend radiographic follow-up after treatment.  - Procalcitonin: 18.80 (05.08.24 @ 06:35)  - seen by speech and swallow - no overt aspiration     #Opiate dependence on Methadone   #ISSA    #Obesity BMI (kg/m2): 38.3  #DM   #Abx allergy: No Known Allergies    RECOMMENDATIONS  - continue ceftriaxone 2g daily   - continue azithromycin (can stop after today's dose)   -- on discharge, switch to PO vantin 200 mg BID to complete 10 days total   - will need repeat imaging in 4-6 weeks     Please call or message on Microsoft Teams if with any questions.  Spectra [phone]

## 2024-05-13 LAB
1OH-MIDAZOLAM UR CFM-MCNC: NEGATIVE NG/ML — SIGNIFICANT CHANGE UP
7AMINOCLONAZEPAM UR CFM-MCNC: 912 NG/ML — HIGH
ALPHA-HYDROXYALPRAZOLAM, UR RESULT: 641 NG/ML — HIGH
ALPRAZ UR CFM-MCNC: 269 NG/ML — HIGH
BENZODIAZ UR QL SCN: POSITIVE
BENZODIAZEPINES IN-HOUSE INTERPRETATION: POSITIVE
CLONAZEPAM UR CFM-MCNC: NEGATIVE NG/ML — SIGNIFICANT CHANGE UP
CULTURE RESULTS: SIGNIFICANT CHANGE UP
CULTURE RESULTS: SIGNIFICANT CHANGE UP
DIAZEPAM UR CFM-MCNC: NEGATIVE NG/ML — SIGNIFICANT CHANGE UP
EDDP UR QL CFM: SIGNIFICANT CHANGE UP NG/ML
EDDP, UR RESULT: SIGNIFICANT CHANGE UP NG/ML
FLUNITRAZEPAM UR CFM-MCNC: NEGATIVE NG/ML — SIGNIFICANT CHANGE UP
FLURAZEPAM UR CFM-MCNC: NEGATIVE NG/ML — SIGNIFICANT CHANGE UP
LORAZEPAM UR CFM-MCNC: NEGATIVE NG/ML — SIGNIFICANT CHANGE UP
METHADONE IN-HOUSE INTERPRETATION: POSITIVE
METHADONE UR CFM-MCNC: POSITIVE
MIDAZOLAM UR CFM-MCNC: NEGATIVE NG/ML — SIGNIFICANT CHANGE UP
NORDIAZEPAM, UR RESULT: NEGATIVE NG/ML — SIGNIFICANT CHANGE UP
OXAZEPAM UR QL SCN: NEGATIVE NG/ML — SIGNIFICANT CHANGE UP
OXAZEPAM, UR RESULT: NEGATIVE NG/ML — SIGNIFICANT CHANGE UP
SPECIMEN SOURCE: SIGNIFICANT CHANGE UP
SPECIMEN SOURCE: SIGNIFICANT CHANGE UP
TEMAZEPAM UR CFM-MCNC: NEGATIVE NG/ML — SIGNIFICANT CHANGE UP

## 2024-05-16 DIAGNOSIS — G47.33 OBSTRUCTIVE SLEEP APNEA (ADULT) (PEDIATRIC): ICD-10-CM

## 2024-05-16 DIAGNOSIS — A41.9 SEPSIS, UNSPECIFIED ORGANISM: ICD-10-CM

## 2024-05-16 DIAGNOSIS — J96.02 ACUTE RESPIRATORY FAILURE WITH HYPERCAPNIA: ICD-10-CM

## 2024-05-16 DIAGNOSIS — E66.9 OBESITY, UNSPECIFIED: ICD-10-CM

## 2024-05-16 DIAGNOSIS — Z87.891 PERSONAL HISTORY OF NICOTINE DEPENDENCE: ICD-10-CM

## 2024-05-16 DIAGNOSIS — J44.0 CHRONIC OBSTRUCTIVE PULMONARY DISEASE WITH (ACUTE) LOWER RESPIRATORY INFECTION: ICD-10-CM

## 2024-05-16 DIAGNOSIS — F41.9 ANXIETY DISORDER, UNSPECIFIED: ICD-10-CM

## 2024-05-16 DIAGNOSIS — J18.9 PNEUMONIA, UNSPECIFIED ORGANISM: ICD-10-CM

## 2024-05-16 DIAGNOSIS — I10 ESSENTIAL (PRIMARY) HYPERTENSION: ICD-10-CM

## 2024-05-16 DIAGNOSIS — F11.20 OPIOID DEPENDENCE, UNCOMPLICATED: ICD-10-CM

## 2024-05-16 DIAGNOSIS — J96.01 ACUTE RESPIRATORY FAILURE WITH HYPOXIA: ICD-10-CM

## 2024-05-16 DIAGNOSIS — R91.8 OTHER NONSPECIFIC ABNORMAL FINDING OF LUNG FIELD: ICD-10-CM

## 2024-05-16 DIAGNOSIS — J44.1 CHRONIC OBSTRUCTIVE PULMONARY DISEASE WITH (ACUTE) EXACERBATION: ICD-10-CM

## 2024-05-21 ENCOUNTER — APPOINTMENT (OUTPATIENT)
Dept: INTERNAL MEDICINE | Facility: CLINIC | Age: 54
End: 2024-05-21

## 2024-05-21 PROBLEM — Z00.00 ENCOUNTER FOR PREVENTIVE HEALTH EXAMINATION: Status: ACTIVE | Noted: 2024-05-21

## 2024-11-20 NOTE — PATIENT PROFILE ADULT - NSPROGENOTHERPROVIDER_GEN_A_NUR
Hx of Full rotator cuff tear-Bilateral shoulder pain, neck pain found to have cervical stenosis/arthritis/stiffness/neck pain respiratory services

## 2025-05-30 ENCOUNTER — EMERGENCY (EMERGENCY)
Facility: HOSPITAL | Age: 55
LOS: 0 days | Discharge: HOME | End: 2025-05-31
Attending: EMERGENCY MEDICINE
Payer: MEDICAID

## 2025-05-30 VITALS
DIASTOLIC BLOOD PRESSURE: 107 MMHG | RESPIRATION RATE: 18 BRPM | TEMPERATURE: 99 F | SYSTOLIC BLOOD PRESSURE: 179 MMHG | HEART RATE: 66 BPM | OXYGEN SATURATION: 100 %

## 2025-05-30 DIAGNOSIS — E78.5 HYPERLIPIDEMIA, UNSPECIFIED: ICD-10-CM

## 2025-05-30 DIAGNOSIS — R51.9 HEADACHE, UNSPECIFIED: ICD-10-CM

## 2025-05-30 DIAGNOSIS — R07.89 OTHER CHEST PAIN: ICD-10-CM

## 2025-05-30 DIAGNOSIS — I10 ESSENTIAL (PRIMARY) HYPERTENSION: ICD-10-CM

## 2025-05-30 DIAGNOSIS — Z98.51 TUBAL LIGATION STATUS: Chronic | ICD-10-CM

## 2025-05-30 DIAGNOSIS — Z87.891 PERSONAL HISTORY OF NICOTINE DEPENDENCE: ICD-10-CM

## 2025-05-30 DIAGNOSIS — F11.20 OPIOID DEPENDENCE, UNCOMPLICATED: ICD-10-CM

## 2025-05-30 DIAGNOSIS — F41.9 ANXIETY DISORDER, UNSPECIFIED: ICD-10-CM

## 2025-05-30 DIAGNOSIS — J44.9 CHRONIC OBSTRUCTIVE PULMONARY DISEASE, UNSPECIFIED: ICD-10-CM

## 2025-05-30 LAB
ALBUMIN SERPL ELPH-MCNC: 4.5 G/DL — SIGNIFICANT CHANGE UP (ref 3.5–5.2)
ALP SERPL-CCNC: 102 U/L — SIGNIFICANT CHANGE UP (ref 30–115)
ALT FLD-CCNC: 31 U/L — SIGNIFICANT CHANGE UP (ref 0–41)
ANION GAP SERPL CALC-SCNC: 15 MMOL/L — HIGH (ref 7–14)
AST SERPL-CCNC: 31 U/L — SIGNIFICANT CHANGE UP (ref 0–41)
BASOPHILS # BLD AUTO: 0.05 K/UL — SIGNIFICANT CHANGE UP (ref 0–0.2)
BASOPHILS NFR BLD AUTO: 0.5 % — SIGNIFICANT CHANGE UP (ref 0–1)
BILIRUB SERPL-MCNC: 0.5 MG/DL — SIGNIFICANT CHANGE UP (ref 0.2–1.2)
BUN SERPL-MCNC: 15 MG/DL — SIGNIFICANT CHANGE UP (ref 10–20)
CALCIUM SERPL-MCNC: 9.3 MG/DL — SIGNIFICANT CHANGE UP (ref 8.4–10.5)
CHLORIDE SERPL-SCNC: 98 MMOL/L — SIGNIFICANT CHANGE UP (ref 98–110)
CO2 SERPL-SCNC: 22 MMOL/L — SIGNIFICANT CHANGE UP (ref 17–32)
CREAT SERPL-MCNC: 0.9 MG/DL — SIGNIFICANT CHANGE UP (ref 0.7–1.5)
EGFR: 76 ML/MIN/1.73M2 — SIGNIFICANT CHANGE UP
EGFR: 76 ML/MIN/1.73M2 — SIGNIFICANT CHANGE UP
EOSINOPHIL # BLD AUTO: 0.12 K/UL — SIGNIFICANT CHANGE UP (ref 0–0.7)
EOSINOPHIL NFR BLD AUTO: 1.1 % — SIGNIFICANT CHANGE UP (ref 0–8)
GLUCOSE SERPL-MCNC: 99 MG/DL — SIGNIFICANT CHANGE UP (ref 70–99)
HCT VFR BLD CALC: 45.5 % — SIGNIFICANT CHANGE UP (ref 37–47)
HGB BLD-MCNC: 15.3 G/DL — SIGNIFICANT CHANGE UP (ref 12–16)
IMM GRANULOCYTES NFR BLD AUTO: 0.3 % — SIGNIFICANT CHANGE UP (ref 0.1–0.3)
LYMPHOCYTES # BLD AUTO: 2.85 K/UL — SIGNIFICANT CHANGE UP (ref 1.2–3.4)
LYMPHOCYTES # BLD AUTO: 27.2 % — SIGNIFICANT CHANGE UP (ref 20.5–51.1)
MCHC RBC-ENTMCNC: 28.8 PG — SIGNIFICANT CHANGE UP (ref 27–31)
MCHC RBC-ENTMCNC: 33.6 G/DL — SIGNIFICANT CHANGE UP (ref 32–37)
MCV RBC AUTO: 85.5 FL — SIGNIFICANT CHANGE UP (ref 81–99)
MONOCYTES # BLD AUTO: 0.88 K/UL — HIGH (ref 0.1–0.6)
MONOCYTES NFR BLD AUTO: 8.4 % — SIGNIFICANT CHANGE UP (ref 1.7–9.3)
NEUTROPHILS # BLD AUTO: 6.53 K/UL — HIGH (ref 1.4–6.5)
NEUTROPHILS NFR BLD AUTO: 62.5 % — SIGNIFICANT CHANGE UP (ref 42.2–75.2)
NRBC BLD AUTO-RTO: 0 /100 WBCS — SIGNIFICANT CHANGE UP (ref 0–0)
PLATELET # BLD AUTO: 155 K/UL — SIGNIFICANT CHANGE UP (ref 130–400)
PMV BLD: 11.7 FL — HIGH (ref 7.4–10.4)
POTASSIUM SERPL-MCNC: 3.9 MMOL/L — SIGNIFICANT CHANGE UP (ref 3.5–5)
POTASSIUM SERPL-SCNC: 3.9 MMOL/L — SIGNIFICANT CHANGE UP (ref 3.5–5)
PROT SERPL-MCNC: 7.5 G/DL — SIGNIFICANT CHANGE UP (ref 6–8)
RBC # BLD: 5.32 M/UL — SIGNIFICANT CHANGE UP (ref 4.2–5.4)
RBC # FLD: 13.2 % — SIGNIFICANT CHANGE UP (ref 11.5–14.5)
SODIUM SERPL-SCNC: 135 MMOL/L — SIGNIFICANT CHANGE UP (ref 135–146)
TROPONIN T, HIGH SENSITIVITY RESULT: 26 NG/L — HIGH (ref 6–13)
TROPONIN T, HIGH SENSITIVITY RESULT: 27 NG/L — HIGH (ref 6–13)
TROPONIN T, HIGH SENSITIVITY RESULT: 28 NG/L — HIGH (ref 6–13)
WBC # BLD: 10.46 K/UL — SIGNIFICANT CHANGE UP (ref 4.8–10.8)
WBC # FLD AUTO: 10.46 K/UL — SIGNIFICANT CHANGE UP (ref 4.8–10.8)

## 2025-05-30 PROCEDURE — 93005 ELECTROCARDIOGRAM TRACING: CPT

## 2025-05-30 PROCEDURE — 75574 CT ANGIO HRT W/3D IMAGE: CPT

## 2025-05-30 PROCEDURE — 71045 X-RAY EXAM CHEST 1 VIEW: CPT | Mod: 26

## 2025-05-30 PROCEDURE — 99285 EMERGENCY DEPT VISIT HI MDM: CPT | Mod: 25

## 2025-05-30 PROCEDURE — 36415 COLL VENOUS BLD VENIPUNCTURE: CPT

## 2025-05-30 PROCEDURE — 80053 COMPREHEN METABOLIC PANEL: CPT

## 2025-05-30 PROCEDURE — 85025 COMPLETE CBC W/AUTO DIFF WBC: CPT

## 2025-05-30 PROCEDURE — 70450 CT HEAD/BRAIN W/O DYE: CPT

## 2025-05-30 PROCEDURE — 99223 1ST HOSP IP/OBS HIGH 75: CPT

## 2025-05-30 PROCEDURE — 96374 THER/PROPH/DIAG INJ IV PUSH: CPT | Mod: XU

## 2025-05-30 PROCEDURE — 71045 X-RAY EXAM CHEST 1 VIEW: CPT

## 2025-05-30 PROCEDURE — 96375 TX/PRO/DX INJ NEW DRUG ADDON: CPT | Mod: XU

## 2025-05-30 PROCEDURE — 93010 ELECTROCARDIOGRAM REPORT: CPT

## 2025-05-30 PROCEDURE — G0378: CPT

## 2025-05-30 PROCEDURE — 70450 CT HEAD/BRAIN W/O DYE: CPT | Mod: 26

## 2025-05-30 PROCEDURE — 84484 ASSAY OF TROPONIN QUANT: CPT

## 2025-05-30 RX ORDER — ALPRAZOLAM 0.5 MG
2 TABLET, EXTENDED RELEASE 24 HR ORAL ONCE
Refills: 0 | Status: DISCONTINUED | OUTPATIENT
Start: 2025-05-30 | End: 2025-05-30

## 2025-05-30 RX ORDER — METOCLOPRAMIDE HCL 10 MG
10 TABLET ORAL ONCE
Refills: 0 | Status: COMPLETED | OUTPATIENT
Start: 2025-05-30 | End: 2025-05-30

## 2025-05-30 RX ORDER — KETOROLAC TROMETHAMINE 30 MG/ML
15 INJECTION, SOLUTION INTRAMUSCULAR; INTRAVENOUS ONCE
Refills: 0 | Status: DISCONTINUED | OUTPATIENT
Start: 2025-05-30 | End: 2025-05-30

## 2025-05-30 RX ADMIN — Medication 2 MILLIGRAM(S): at 10:06

## 2025-05-30 RX ADMIN — Medication 2 MILLIGRAM(S): at 15:09

## 2025-05-30 RX ADMIN — Medication 1000 MILLILITER(S): at 10:05

## 2025-05-30 RX ADMIN — Medication 10 MILLIGRAM(S): at 15:09

## 2025-05-30 RX ADMIN — KETOROLAC TROMETHAMINE 15 MILLIGRAM(S): 30 INJECTION, SOLUTION INTRAMUSCULAR; INTRAVENOUS at 10:05

## 2025-05-31 VITALS
RESPIRATION RATE: 18 BRPM | OXYGEN SATURATION: 97 % | HEART RATE: 50 BPM | TEMPERATURE: 97 F | SYSTOLIC BLOOD PRESSURE: 161 MMHG | DIASTOLIC BLOOD PRESSURE: 86 MMHG

## 2025-05-31 PROCEDURE — 75574 CT ANGIO HRT W/3D IMAGE: CPT | Mod: 26

## 2025-05-31 PROCEDURE — 99239 HOSP IP/OBS DSCHRG MGMT >30: CPT

## 2025-05-31 RX ORDER — METOPROLOL SUCCINATE 50 MG/1
100 TABLET, EXTENDED RELEASE ORAL ONCE
Refills: 0 | Status: COMPLETED | OUTPATIENT
Start: 2025-05-31 | End: 2025-05-31

## 2025-05-31 RX ORDER — ALPRAZOLAM 0.5 MG
0.5 TABLET, EXTENDED RELEASE 24 HR ORAL ONCE
Refills: 0 | Status: DISCONTINUED | OUTPATIENT
Start: 2025-05-31 | End: 2025-05-31

## 2025-05-31 RX ORDER — ACETAMINOPHEN 500 MG/5ML
975 LIQUID (ML) ORAL ONCE
Refills: 0 | Status: COMPLETED | OUTPATIENT
Start: 2025-05-31 | End: 2025-05-31

## 2025-05-31 RX ORDER — ALPRAZOLAM 0.5 MG
2 TABLET, EXTENDED RELEASE 24 HR ORAL ONCE
Refills: 0 | Status: DISCONTINUED | OUTPATIENT
Start: 2025-05-31 | End: 2025-05-31

## 2025-05-31 RX ORDER — SODIUM CHLORIDE 9 G/1000ML
1000 INJECTION, SOLUTION INTRAVENOUS ONCE
Refills: 0 | Status: COMPLETED | OUTPATIENT
Start: 2025-05-31 | End: 2025-05-31

## 2025-05-31 RX ORDER — ALPRAZOLAM 0.5 MG
1 TABLET, EXTENDED RELEASE 24 HR ORAL ONCE
Refills: 0 | Status: DISCONTINUED | OUTPATIENT
Start: 2025-05-31 | End: 2025-05-31

## 2025-05-31 RX ADMIN — Medication 975 MILLIGRAM(S): at 05:21

## 2025-05-31 RX ADMIN — Medication 2 MILLIGRAM(S): at 05:20

## 2025-05-31 RX ADMIN — METOPROLOL SUCCINATE 100 MILLIGRAM(S): 50 TABLET, EXTENDED RELEASE ORAL at 09:12

## 2025-05-31 RX ADMIN — Medication 1 MILLIGRAM(S): at 09:11

## 2025-05-31 RX ADMIN — SODIUM CHLORIDE 1000 MILLILITER(S): 9 INJECTION, SOLUTION INTRAVENOUS at 05:08

## 2025-06-03 ENCOUNTER — EMERGENCY (EMERGENCY)
Facility: HOSPITAL | Age: 55
LOS: 0 days | Discharge: HOME | End: 2025-06-03
Attending: EMERGENCY MEDICINE
Payer: MEDICAID

## 2025-06-03 VITALS
RESPIRATION RATE: 18 BRPM | TEMPERATURE: 98 F | HEIGHT: 67 IN | OXYGEN SATURATION: 99 % | DIASTOLIC BLOOD PRESSURE: 90 MMHG | HEART RATE: 102 BPM | SYSTOLIC BLOOD PRESSURE: 130 MMHG | WEIGHT: 250 LBS

## 2025-06-03 VITALS — HEART RATE: 93 BPM | SYSTOLIC BLOOD PRESSURE: 122 MMHG | DIASTOLIC BLOOD PRESSURE: 89 MMHG

## 2025-06-03 DIAGNOSIS — F41.9 ANXIETY DISORDER, UNSPECIFIED: ICD-10-CM

## 2025-06-03 DIAGNOSIS — R51.9 HEADACHE, UNSPECIFIED: ICD-10-CM

## 2025-06-03 DIAGNOSIS — I10 ESSENTIAL (PRIMARY) HYPERTENSION: ICD-10-CM

## 2025-06-03 DIAGNOSIS — Z98.51 TUBAL LIGATION STATUS: Chronic | ICD-10-CM

## 2025-06-03 DIAGNOSIS — R07.9 CHEST PAIN, UNSPECIFIED: ICD-10-CM

## 2025-06-03 DIAGNOSIS — F11.20 OPIOID DEPENDENCE, UNCOMPLICATED: ICD-10-CM

## 2025-06-03 LAB
ALBUMIN SERPL ELPH-MCNC: 4.8 G/DL — SIGNIFICANT CHANGE UP (ref 3.5–5.2)
ALP SERPL-CCNC: 97 U/L — SIGNIFICANT CHANGE UP (ref 30–115)
ALT FLD-CCNC: 29 U/L — SIGNIFICANT CHANGE UP (ref 0–41)
ANION GAP SERPL CALC-SCNC: 10 MMOL/L — SIGNIFICANT CHANGE UP (ref 7–14)
AST SERPL-CCNC: 24 U/L — SIGNIFICANT CHANGE UP (ref 0–41)
BASOPHILS # BLD AUTO: 0.02 K/UL — SIGNIFICANT CHANGE UP (ref 0–0.2)
BASOPHILS NFR BLD AUTO: 0.2 % — SIGNIFICANT CHANGE UP (ref 0–1)
BILIRUB SERPL-MCNC: 0.5 MG/DL — SIGNIFICANT CHANGE UP (ref 0.2–1.2)
BUN SERPL-MCNC: 10 MG/DL — SIGNIFICANT CHANGE UP (ref 10–20)
CALCIUM SERPL-MCNC: 9.9 MG/DL — SIGNIFICANT CHANGE UP (ref 8.4–10.5)
CHLORIDE SERPL-SCNC: 100 MMOL/L — SIGNIFICANT CHANGE UP (ref 98–110)
CO2 SERPL-SCNC: 28 MMOL/L — SIGNIFICANT CHANGE UP (ref 17–32)
CREAT SERPL-MCNC: 0.8 MG/DL — SIGNIFICANT CHANGE UP (ref 0.7–1.5)
EGFR: 87 ML/MIN/1.73M2 — SIGNIFICANT CHANGE UP
EGFR: 87 ML/MIN/1.73M2 — SIGNIFICANT CHANGE UP
EOSINOPHIL # BLD AUTO: 0.08 K/UL — SIGNIFICANT CHANGE UP (ref 0–0.7)
EOSINOPHIL NFR BLD AUTO: 0.9 % — SIGNIFICANT CHANGE UP (ref 0–8)
GLUCOSE SERPL-MCNC: 86 MG/DL — SIGNIFICANT CHANGE UP (ref 70–99)
HCT VFR BLD CALC: 44.7 % — SIGNIFICANT CHANGE UP (ref 37–47)
HGB BLD-MCNC: 15.5 G/DL — SIGNIFICANT CHANGE UP (ref 12–16)
IMM GRANULOCYTES NFR BLD AUTO: 0.3 % — SIGNIFICANT CHANGE UP (ref 0.1–0.3)
LYMPHOCYTES # BLD AUTO: 1.93 K/UL — SIGNIFICANT CHANGE UP (ref 1.2–3.4)
LYMPHOCYTES # BLD AUTO: 21.4 % — SIGNIFICANT CHANGE UP (ref 20.5–51.1)
MCHC RBC-ENTMCNC: 29.4 PG — SIGNIFICANT CHANGE UP (ref 27–31)
MCHC RBC-ENTMCNC: 34.7 G/DL — SIGNIFICANT CHANGE UP (ref 32–37)
MCV RBC AUTO: 84.7 FL — SIGNIFICANT CHANGE UP (ref 81–99)
MONOCYTES # BLD AUTO: 0.82 K/UL — HIGH (ref 0.1–0.6)
MONOCYTES NFR BLD AUTO: 9.1 % — SIGNIFICANT CHANGE UP (ref 1.7–9.3)
NEUTROPHILS # BLD AUTO: 6.15 K/UL — SIGNIFICANT CHANGE UP (ref 1.4–6.5)
NEUTROPHILS NFR BLD AUTO: 68.1 % — SIGNIFICANT CHANGE UP (ref 42.2–75.2)
NRBC BLD AUTO-RTO: 0 /100 WBCS — SIGNIFICANT CHANGE UP (ref 0–0)
PLATELET # BLD AUTO: 176 K/UL — SIGNIFICANT CHANGE UP (ref 130–400)
PMV BLD: 12.1 FL — HIGH (ref 7.4–10.4)
POTASSIUM SERPL-MCNC: 4.3 MMOL/L — SIGNIFICANT CHANGE UP (ref 3.5–5)
POTASSIUM SERPL-SCNC: 4.3 MMOL/L — SIGNIFICANT CHANGE UP (ref 3.5–5)
PROT SERPL-MCNC: 8.3 G/DL — HIGH (ref 6–8)
RBC # BLD: 5.28 M/UL — SIGNIFICANT CHANGE UP (ref 4.2–5.4)
RBC # FLD: 13.4 % — SIGNIFICANT CHANGE UP (ref 11.5–14.5)
SODIUM SERPL-SCNC: 138 MMOL/L — SIGNIFICANT CHANGE UP (ref 135–146)
TROPONIN T, HIGH SENSITIVITY RESULT: 25 NG/L — HIGH (ref 6–13)
TROPONIN T, HIGH SENSITIVITY RESULT: 25 NG/L — HIGH (ref 6–13)
WBC # BLD: 9.03 K/UL — SIGNIFICANT CHANGE UP (ref 4.8–10.8)
WBC # FLD AUTO: 9.03 K/UL — SIGNIFICANT CHANGE UP (ref 4.8–10.8)

## 2025-06-03 PROCEDURE — 93010 ELECTROCARDIOGRAM REPORT: CPT

## 2025-06-03 PROCEDURE — 99285 EMERGENCY DEPT VISIT HI MDM: CPT | Mod: 25

## 2025-06-03 PROCEDURE — 80053 COMPREHEN METABOLIC PANEL: CPT

## 2025-06-03 PROCEDURE — 93005 ELECTROCARDIOGRAM TRACING: CPT

## 2025-06-03 PROCEDURE — 71045 X-RAY EXAM CHEST 1 VIEW: CPT | Mod: 26

## 2025-06-03 PROCEDURE — 36415 COLL VENOUS BLD VENIPUNCTURE: CPT

## 2025-06-03 PROCEDURE — 96374 THER/PROPH/DIAG INJ IV PUSH: CPT

## 2025-06-03 PROCEDURE — 99285 EMERGENCY DEPT VISIT HI MDM: CPT

## 2025-06-03 PROCEDURE — 85025 COMPLETE CBC W/AUTO DIFF WBC: CPT

## 2025-06-03 PROCEDURE — 71045 X-RAY EXAM CHEST 1 VIEW: CPT

## 2025-06-03 PROCEDURE — 84484 ASSAY OF TROPONIN QUANT: CPT

## 2025-06-03 RX ORDER — HYDROXYZINE HYDROCHLORIDE 25 MG/1
50 TABLET, FILM COATED ORAL ONCE
Refills: 0 | Status: DISCONTINUED | OUTPATIENT
Start: 2025-06-03 | End: 2025-06-03

## 2025-06-03 RX ORDER — ACETAMINOPHEN 500 MG/5ML
975 LIQUID (ML) ORAL ONCE
Refills: 0 | Status: COMPLETED | OUTPATIENT
Start: 2025-06-03 | End: 2025-06-03

## 2025-06-03 RX ORDER — KETOROLAC TROMETHAMINE 30 MG/ML
15 INJECTION, SOLUTION INTRAMUSCULAR; INTRAVENOUS ONCE
Refills: 0 | Status: DISCONTINUED | OUTPATIENT
Start: 2025-06-03 | End: 2025-06-03

## 2025-06-03 RX ORDER — ALPRAZOLAM 0.5 MG
2 TABLET, EXTENDED RELEASE 24 HR ORAL ONCE
Refills: 0 | Status: DISCONTINUED | OUTPATIENT
Start: 2025-06-03 | End: 2025-06-03

## 2025-06-03 RX ADMIN — KETOROLAC TROMETHAMINE 15 MILLIGRAM(S): 30 INJECTION, SOLUTION INTRAMUSCULAR; INTRAVENOUS at 09:57

## 2025-06-03 RX ADMIN — HYDROXYZINE HYDROCHLORIDE 50 MILLIGRAM(S): 25 TABLET, FILM COATED ORAL at 09:27

## 2025-06-03 RX ADMIN — Medication 2 MILLIGRAM(S): at 09:57

## 2025-06-03 RX ADMIN — Medication 975 MILLIGRAM(S): at 09:27

## 2025-06-03 NOTE — ED PROVIDER NOTE - PATIENT PORTAL LINK FT
You can access the FollowMyHealth Patient Portal offered by [facility] by registering at the following website: [email or web address]. By joining [email or web address]’s FollowMyHealth portal, you will also be able to view your health information using other applications (apps) compatible with our system.

## 2025-06-03 NOTE — ED PROVIDER NOTE - ATTENDING APP SHARED VISIT CONTRIBUTION OF CARE
Patient is a 54-year-old female who was seen and examined with the physician assistant.  The patient has a past medical history of anxiety, hypertension, opioid use disorder on 130 mg methadone per day.  She recently ran out of her Xanax 1 week ago as well as her Zoloft but has prescription refills ready.  The patient was at our emergency department at the end of May for chest pain and had an unremarkable CCTA.  The patient presents today complaining of headache, chest pain and is asking for Xanax since she ran out.  She is tearful on exam.    On our exam she is alert and oriented x 3 and is tearful at times.  Heart regular rate rhythm.  Lungs clear to auscultation bilaterally.    EKG normal sinus rhythm at 79 bpm with no ischemic abnormalities.  Her QTc is 392    Impression: chest pain and headache in the setting of recently running out of her benzodiazepines.  Benzo withdrawal can be a contributing factor    Plan:   Will check labs, monitor, give Benzos     checked.  She did get a 30 day supply of xanax in April.

## 2025-06-03 NOTE — ED PROVIDER NOTE - CLINICAL SUMMARY MEDICAL DECISION MAKING FREE TEXT BOX
Patient is a 54-year-old female who was seen and examined with the physician assistant.  The patient has a past medical history of anxiety, hypertension, opioid use disorder on 130 mg methadone per day.  She recently ran out of her Xanax 1 week ago as well as her Zoloft but has prescription refills ready.  The patient was at our emergency department at the end of May for chest pain and had an unremarkable CCTA.  The patient presents today complaining of headache, chest pain and is asking for Xanax since she ran out.  She is tearful on exam.    On our exam she is alert and oriented x 3 and is tearful at times.  Heart regular rate rhythm.  Lungs clear to auscultation bilaterally.    EKG normal sinus rhythm at 79 bpm with no ischemic abnormalities.  Her QTc is 392    Impression: chest pain and headache in the setting of recently running out of her benzodiazepines.  Benzo withdrawal can be a contributing factor    Plan:   Will check labs, monitor, give Benzos     checked.  She did get a 30 day supply of xanax in April. Patient is a 54-year-old female who was seen and examined with the physician assistant.  The patient has a past medical history of anxiety, hypertension, opioid use disorder on 130 mg methadone per day.  She recently ran out of her Xanax 1 week ago as well as her Zoloft but has prescription refills ready.  The patient was at our emergency department at the end of May for chest pain and had an unremarkable CCTA.  The patient presents today complaining of headache, chest pain and is asking for Xanax since she ran out.  She is tearful on exam.    On our exam she is alert and oriented x 3 and is tearful at times.  Heart regular rate rhythm.  Lungs clear to auscultation bilaterally.    EKG normal sinus rhythm at 79 bpm with no ischemic abnormalities.  Her QTc is 392    CXR ordered.     Labs obtained.    Impression: chest pain and headache in the setting of recently running out of her benzodiazepines.  Benzo withdrawal can be a contributing factor    Plan:   Will check labs, monitor, give Benzos     checked.  She did get a 30 day supply of xanax in April.

## 2025-06-03 NOTE — ED PROVIDER NOTE - OBJECTIVE STATEMENT
54-year-old female past medical history of anxiety on Xanax, hypertension, opiate dependency on methadone recently admitted for chest pain and had negative CCTA presents for chest pain and headache.  Started today. Denies fever, chills, sob, abd pain, nvd, dizziness, syncope.

## 2025-06-03 NOTE — ED PROVIDER NOTE - NSFOLLOWUPINSTRUCTIONS_ED_ALL_ED_FT
Our Emergency Department Referral Coordinators will be reaching out to you in the next 24-48 hours from 9:00am to 5:00pm with a follow up appointment. Please expect a phone call from the hospital in that time frame. If you do not receive a call or if you have any questions or concerns, you can reach them at   [phone]       Chest Pain    Chest pain can be caused by many different conditions which may or may not be dangerous. Causes include heartburn, lung infections, heart attack, blood clot in lungs, skin infections, strain or damage to muscle, cartilage, or bones, etc. In addition to a history and physical examination, an electrocardiogram (ECG) or other lab tests may have been performed to determine the cause of your chest pain. Follow up with your primary care provider or with a cardiologist as instructed.     SEEK IMMEDIATE MEDICAL CARE IF YOU HAVE ANY OF THE FOLLOWING SYMPTOMS: worsening chest pain, coughing up blood, unexplained back/neck/jaw pain, severe abdominal pain, dizziness or lightheadedness, fainting, shortness of breath, sweaty or clammy skin, vomiting, or racing heart beat. These symptoms may represent a serious problem that is an emergency. Do not wait to see if the symptoms will go away. Get medical help right away. Call 911 and do not drive yourself to the hospital.

## 2025-06-04 ENCOUNTER — NON-APPOINTMENT (OUTPATIENT)
Age: 55
End: 2025-06-04

## 2025-06-06 ENCOUNTER — EMERGENCY (EMERGENCY)
Facility: HOSPITAL | Age: 55
LOS: 0 days | Discharge: HOME | End: 2025-06-07
Attending: STUDENT IN AN ORGANIZED HEALTH CARE EDUCATION/TRAINING PROGRAM
Payer: MEDICAID

## 2025-06-06 VITALS
HEART RATE: 69 BPM | TEMPERATURE: 98 F | RESPIRATION RATE: 18 BRPM | OXYGEN SATURATION: 98 % | HEIGHT: 67 IN | DIASTOLIC BLOOD PRESSURE: 71 MMHG | SYSTOLIC BLOOD PRESSURE: 102 MMHG

## 2025-06-06 VITALS — SYSTOLIC BLOOD PRESSURE: 119 MMHG | DIASTOLIC BLOOD PRESSURE: 74 MMHG | HEART RATE: 67 BPM

## 2025-06-06 DIAGNOSIS — R00.1 BRADYCARDIA, UNSPECIFIED: ICD-10-CM

## 2025-06-06 DIAGNOSIS — T42.4X1A POISONING BY BENZODIAZEPINES, ACCIDENTAL (UNINTENTIONAL), INITIAL ENCOUNTER: ICD-10-CM

## 2025-06-06 DIAGNOSIS — R10.10 UPPER ABDOMINAL PAIN, UNSPECIFIED: ICD-10-CM

## 2025-06-06 DIAGNOSIS — Z98.51 TUBAL LIGATION STATUS: Chronic | ICD-10-CM

## 2025-06-06 DIAGNOSIS — E11.9 TYPE 2 DIABETES MELLITUS WITHOUT COMPLICATIONS: ICD-10-CM

## 2025-06-06 DIAGNOSIS — F11.20 OPIOID DEPENDENCE, UNCOMPLICATED: ICD-10-CM

## 2025-06-06 DIAGNOSIS — J44.9 CHRONIC OBSTRUCTIVE PULMONARY DISEASE, UNSPECIFIED: ICD-10-CM

## 2025-06-06 DIAGNOSIS — I10 ESSENTIAL (PRIMARY) HYPERTENSION: ICD-10-CM

## 2025-06-06 LAB
ALBUMIN SERPL ELPH-MCNC: 4.5 G/DL — SIGNIFICANT CHANGE UP (ref 3.5–5.2)
ALP SERPL-CCNC: 83 U/L — SIGNIFICANT CHANGE UP (ref 30–115)
ALT FLD-CCNC: 21 U/L — SIGNIFICANT CHANGE UP (ref 0–41)
ANION GAP SERPL CALC-SCNC: 16 MMOL/L — HIGH (ref 7–14)
AST SERPL-CCNC: 21 U/L — SIGNIFICANT CHANGE UP (ref 0–41)
BASOPHILS # BLD AUTO: 0.03 K/UL — SIGNIFICANT CHANGE UP (ref 0–0.2)
BASOPHILS NFR BLD AUTO: 0.4 % — SIGNIFICANT CHANGE UP (ref 0–1)
BILIRUB SERPL-MCNC: 0.4 MG/DL — SIGNIFICANT CHANGE UP (ref 0.2–1.2)
BUN SERPL-MCNC: 18 MG/DL — SIGNIFICANT CHANGE UP (ref 10–20)
CALCIUM SERPL-MCNC: 9.3 MG/DL — SIGNIFICANT CHANGE UP (ref 8.4–10.5)
CHLORIDE SERPL-SCNC: 99 MMOL/L — SIGNIFICANT CHANGE UP (ref 98–110)
CO2 SERPL-SCNC: 20 MMOL/L — SIGNIFICANT CHANGE UP (ref 17–32)
CREAT SERPL-MCNC: 1.1 MG/DL — SIGNIFICANT CHANGE UP (ref 0.7–1.5)
EGFR: 59 ML/MIN/1.73M2 — LOW
EGFR: 59 ML/MIN/1.73M2 — LOW
EOSINOPHIL # BLD AUTO: 0.12 K/UL — SIGNIFICANT CHANGE UP (ref 0–0.7)
EOSINOPHIL NFR BLD AUTO: 1.5 % — SIGNIFICANT CHANGE UP (ref 0–8)
FLUAV AG NPH QL: SIGNIFICANT CHANGE UP
FLUBV AG NPH QL: SIGNIFICANT CHANGE UP
GAS PNL BLDV: SIGNIFICANT CHANGE UP
GLUCOSE SERPL-MCNC: 98 MG/DL — SIGNIFICANT CHANGE UP (ref 70–99)
HCG SERPL QL: NEGATIVE — SIGNIFICANT CHANGE UP
HCT VFR BLD CALC: 42.5 % — SIGNIFICANT CHANGE UP (ref 37–47)
HGB BLD-MCNC: 14.4 G/DL — SIGNIFICANT CHANGE UP (ref 12–16)
IMM GRANULOCYTES NFR BLD AUTO: 0.4 % — HIGH (ref 0.1–0.3)
LIDOCAIN IGE QN: 18 U/L — SIGNIFICANT CHANGE UP (ref 7–60)
LYMPHOCYTES # BLD AUTO: 2.88 K/UL — SIGNIFICANT CHANGE UP (ref 1.2–3.4)
LYMPHOCYTES # BLD AUTO: 35 % — SIGNIFICANT CHANGE UP (ref 20.5–51.1)
MCHC RBC-ENTMCNC: 28.6 PG — SIGNIFICANT CHANGE UP (ref 27–31)
MCHC RBC-ENTMCNC: 33.9 G/DL — SIGNIFICANT CHANGE UP (ref 32–37)
MCV RBC AUTO: 84.5 FL — SIGNIFICANT CHANGE UP (ref 81–99)
MONOCYTES # BLD AUTO: 0.86 K/UL — HIGH (ref 0.1–0.6)
MONOCYTES NFR BLD AUTO: 10.4 % — HIGH (ref 1.7–9.3)
NEUTROPHILS # BLD AUTO: 4.32 K/UL — SIGNIFICANT CHANGE UP (ref 1.4–6.5)
NEUTROPHILS NFR BLD AUTO: 52.3 % — SIGNIFICANT CHANGE UP (ref 42.2–75.2)
NRBC BLD AUTO-RTO: 0 /100 WBCS — SIGNIFICANT CHANGE UP (ref 0–0)
PLATELET # BLD AUTO: 154 K/UL — SIGNIFICANT CHANGE UP (ref 130–400)
PMV BLD: 12.5 FL — HIGH (ref 7.4–10.4)
POTASSIUM SERPL-MCNC: 3.8 MMOL/L — SIGNIFICANT CHANGE UP (ref 3.5–5)
POTASSIUM SERPL-SCNC: 3.8 MMOL/L — SIGNIFICANT CHANGE UP (ref 3.5–5)
PROT SERPL-MCNC: 7.2 G/DL — SIGNIFICANT CHANGE UP (ref 6–8)
RBC # BLD: 5.03 M/UL — SIGNIFICANT CHANGE UP (ref 4.2–5.4)
RBC # FLD: 13.2 % — SIGNIFICANT CHANGE UP (ref 11.5–14.5)
RSV RNA NPH QL NAA+NON-PROBE: SIGNIFICANT CHANGE UP
SARS-COV-2 RNA SPEC QL NAA+PROBE: SIGNIFICANT CHANGE UP
SODIUM SERPL-SCNC: 135 MMOL/L — SIGNIFICANT CHANGE UP (ref 135–146)
SOURCE RESPIRATORY: SIGNIFICANT CHANGE UP
WBC # BLD: 8.24 K/UL — SIGNIFICANT CHANGE UP (ref 4.8–10.8)
WBC # FLD AUTO: 8.24 K/UL — SIGNIFICANT CHANGE UP (ref 4.8–10.8)

## 2025-06-06 PROCEDURE — 71045 X-RAY EXAM CHEST 1 VIEW: CPT | Mod: 26

## 2025-06-06 PROCEDURE — 80053 COMPREHEN METABOLIC PANEL: CPT

## 2025-06-06 PROCEDURE — 85018 HEMOGLOBIN: CPT

## 2025-06-06 PROCEDURE — 84132 ASSAY OF SERUM POTASSIUM: CPT

## 2025-06-06 PROCEDURE — 82962 GLUCOSE BLOOD TEST: CPT

## 2025-06-06 PROCEDURE — 84295 ASSAY OF SERUM SODIUM: CPT

## 2025-06-06 PROCEDURE — 96375 TX/PRO/DX INJ NEW DRUG ADDON: CPT

## 2025-06-06 PROCEDURE — 0241U: CPT

## 2025-06-06 PROCEDURE — 93005 ELECTROCARDIOGRAM TRACING: CPT

## 2025-06-06 PROCEDURE — 99285 EMERGENCY DEPT VISIT HI MDM: CPT | Mod: 25

## 2025-06-06 PROCEDURE — 36415 COLL VENOUS BLD VENIPUNCTURE: CPT

## 2025-06-06 PROCEDURE — 74177 CT ABD & PELVIS W/CONTRAST: CPT

## 2025-06-06 PROCEDURE — 82330 ASSAY OF CALCIUM: CPT

## 2025-06-06 PROCEDURE — 83605 ASSAY OF LACTIC ACID: CPT

## 2025-06-06 PROCEDURE — 99285 EMERGENCY DEPT VISIT HI MDM: CPT

## 2025-06-06 PROCEDURE — 84703 CHORIONIC GONADOTROPIN ASSAY: CPT

## 2025-06-06 PROCEDURE — 85014 HEMATOCRIT: CPT

## 2025-06-06 PROCEDURE — 93010 ELECTROCARDIOGRAM REPORT: CPT

## 2025-06-06 PROCEDURE — 71045 X-RAY EXAM CHEST 1 VIEW: CPT

## 2025-06-06 PROCEDURE — 82803 BLOOD GASES ANY COMBINATION: CPT

## 2025-06-06 PROCEDURE — 96374 THER/PROPH/DIAG INJ IV PUSH: CPT | Mod: XU

## 2025-06-06 PROCEDURE — 74177 CT ABD & PELVIS W/CONTRAST: CPT | Mod: 26

## 2025-06-06 PROCEDURE — 83690 ASSAY OF LIPASE: CPT

## 2025-06-06 PROCEDURE — 85025 COMPLETE CBC W/AUTO DIFF WBC: CPT

## 2025-06-06 RX ORDER — NALOXONE HYDROCHLORIDE 0.4 MG/ML
0.04 INJECTION, SOLUTION INTRAMUSCULAR; INTRAVENOUS; SUBCUTANEOUS ONCE
Refills: 0 | Status: COMPLETED | OUTPATIENT
Start: 2025-06-06 | End: 2025-06-06

## 2025-06-06 RX ORDER — KETOROLAC TROMETHAMINE 30 MG/ML
15 INJECTION, SOLUTION INTRAMUSCULAR; INTRAVENOUS ONCE
Refills: 0 | Status: DISCONTINUED | OUTPATIENT
Start: 2025-06-06 | End: 2025-06-06

## 2025-06-06 RX ADMIN — NALOXONE HYDROCHLORIDE 0.04 MILLIGRAM(S): 0.4 INJECTION, SOLUTION INTRAMUSCULAR; INTRAVENOUS; SUBCUTANEOUS at 17:35

## 2025-06-06 RX ADMIN — Medication 1000 MILLILITER(S): at 15:55

## 2025-06-06 NOTE — ED ADULT NURSE NOTE - NSFALLRISKINTERV_ED_ALL_ED
Assistance OOB with selected safe patient handling equipment if applicable/Communicate fall risk and risk factors to all staff, patient, and family/Monitor for mental status changes and reorient to person, place, and time, as needed/Move patient closer to nursing station/within visual sight of ED staff/Provide visual cue: yellow wristband, yellow gown, etc/Reinforce activity limits and safety measures with patient and family/Toileting schedule using arm’s reach rule for commode and bathroom/Use of alarms - bed, stretcher, chair and/or video monitoring/Call bell, personal items and telephone in reach/Instruct patient to call for assistance before getting out of bed/chair/stretcher/Non-slip footwear applied when patient is off stretcher/[place] to call system/Physically safe environment - no spills, clutter or unnecessary equipment/Purposeful Proactive Rounding/Room/bathroom lighting operational, light cord in reach

## 2025-06-06 NOTE — ED PROVIDER NOTE - CLINICAL SUMMARY MEDICAL DECISION MAKING FREE TEXT BOX
55 yo F presented to ED s/p overdose. Given Narcan. Observed in ED. Labs and EKG were ordered and reviewed.  Imaging was ordered and reviewed by me.  No acute pathology on imaging. Appropriate medications for patient's presenting complaints were ordered and effects were reassessed.  Patient's records (prior hospital, ED visit, and/or nursing home notes if available) were reviewed.  Additional history was obtained from EMS, family, and/or PCP (where available).  Escalation to admission/observation was considered.  However patient feels much better and is comfortable with discharge.  Ambulating at baseline. Appropriate follow-up was arranged. Return precautions discussed in detail.

## 2025-06-06 NOTE — ED PROVIDER NOTE - PATIENT PORTAL LINK FT
You can access the FollowMyHealth Patient Portal offered by [facility] by registering at the following website: [email or web address]. By joining [employer]’s FollowMyHealth portal, you will also be able to view your health information using other applications (apps) compatible with our system.

## 2025-06-06 NOTE — ED PROVIDER NOTE - OBJECTIVE STATEMENT
4-year-old female past medical history of anxiety disorder, COPD, hypertension, opiate dependence on methadone presenting for overdose. [place] bus stop  and unresponsive. Pt was given 1 mg of Narcan with immediate alertness and woke up. Pt states she took her xanax today but not methadone. Pt states she has abdominal pain as well. Pt denies nausea, vomiting, diarrhea, shortness of breath, chest pain, back pain. 54-year-old female past medical history of anxiety disorder, COPD, hypertension, opiate dependence on methadone presenting for overdose. Was found at bus stop  and unresponsive. Pt was given 1 mg of Narcan with immediate alertness and woke up. Pt states she took her xanax today but not methadone. Pt states she has abdominal pain as well. Pt denies nausea, vomiting, diarrhea, shortness of breath, chest pain, back pain.

## 2025-06-06 NOTE — ED PROVIDER NOTE - PROGRESS NOTE DETAILS
Patient signed out to Dr. Marinelli pending labs, imaging and reassessment. Authored by Cinthya Marinelli, DO: Patient with decreased respirations and decrease responsiveness. Requiring IV Narcan. Patient awake after IV Narcan. Will continue to monitor and reassess.

## 2025-06-06 NOTE — ED PROVIDER NOTE - NSFOLLOWUPCLINICS_GEN_ALL_ED_FT
A Family Medicine Doctor  Family Medicine  .  [place]   Phone:   Fax:   Follow Up Time: 1-3 Days    [facility]  Detox Mgmt  [address] [place] [address]  Phone: [phone]  Fax:   Follow Up Time: 7-10 Days

## 2025-06-06 NOTE — ED PROVIDER NOTE - ATTENDING CONTRIBUTION TO CARE
55 yo F pmh of COPD, DM, HTN, anxiety on xanax and opiod dependency on methadone presents after an overdose. [place] blue and un[place]. EMS noted pinpoint pupils and gave IN narcan 2mg with improvement in breathing and mentation. Patient admits to using xanax today. Denies taking her methadone or other opioids today. no alcohol or other drug use. Patient reports some abdominal pain which is new. no fall or trauma. no fever. no other complaints at this time.     CONSTITUTIONAL: Well-developed; well-nourished; in no acute distress.   SKIN: warm, dry  HEAD: Normocephalic; atraumatic.  EYES: PERRL, EOMI, no conjunctival erythema. 3mm pupils bilaterally.   ENT: No nasal discharge; airway clear.  NECK: Supple; non tender.  CARD: S1, S2 normal;  Regular rate and rhythm.   RESP: No wheezes, rales or rhonchi.  ABD: soft, + upper abdominal tenderness, non distended, no rebound or guarding  EXT: Normal ROM.  5/5 strength in all 4 extremities   LYMPH: No acute cervical adenopathy.  NEURO: Alert, oriented, grossly unremarkable. neurovascularly intact  PSYCH: Cooperative, appropriate.

## 2025-06-06 NOTE — ED PROVIDER NOTE - PHYSICAL EXAMINATION
VITAL SIGNS: I have reviewed nursing notes and confirm.  CONSTITUTIONAL: well-appearing, non-toxic, NAD  SKIN: Warm dry, normal skin turgor  HEAD: NCAT  EYES: EOMI, PERRLA, no scleral icterus  ENT: Moist mucous membranes, normal pharynx with no erythema or exudates  NECK: Supple; non tender. Full ROM. No cervical LAD  CARD: RRR, no murmurs, rubs or gallops  RESP: clear to ausculation b/l.  No rales, rhonchi, or wheezing.  ABD: diffuse TTP, +distension No CVA tenderness  EXT: Full ROM, no bony tenderness, no pedal edema, no calf tenderness  NEURO: normal motor. normal sensory.   PSYCH: Cooperative, appropriate.

## 2025-06-06 NOTE — ED PROVIDER NOTE - NSFOLLOWUPINSTRUCTIONS_ED_ALL_ED_FT
Our Emergency Department Referral Coordinators will be reaching out to you in the next 24-48 hours from 9:00am to 5:00pm to schedule a follow up appointment. Please expect a phone call from the hospital in that time frame. If you do not receive a call or if you have any questions or concerns, you can reach them at   [phone].     Accidental Overdose    An overdose happens when you take too much of a substance, such as a prescription medicine, an illegal drug, or an over-the-counter medicine. The effects of an overdose can be mild, dangerous, or even deadly.     CAUSES  This condition may be caused by:    Lack of knowledge about the substance that is used.  Using more than one substance at the same time.  An error made by a health care provider who prescribes a medicine.  An error made by the pharmacist who fills the prescription order.  Lapses in memory. For example, forgetting that you have already taken a dose of your medicine.  Suddenly using a substance after a long period of not using it.    Substances that can cause an accidental overdose include:    Alcohol.  Psychotropic medicines.  Pain medicines.  Cocaine.  Heroin.  Multivitamins that contain iron. This is a common cause of accidental overdose in young children.    RISK FACTORS  This condition is more likely in:    Children. They may be attracted to colorful pills. Because of a child's small size, even a small amount of a substance can be dangerous.  Elderly people. They may be taking many different medicines. Elderly people may have difficulty reading labels or remembering when they last took their medicine.  People:  Who use illegal drugs.  Who drink alcohol while using drugs or certain medicines.  Who have certain mental health conditions.    SYMPTOMS  Symptoms of this condition depend on the substance and the amount that was taken. Common symptoms include:    Behavior changes, such as confusion.  Sleepiness.  Weakness.  Slowed breathing.  Nausea and vomiting.  Seizures.  Changes in eye pupil size. The pupil may be very large or very small.    If there are signs and symptoms of very low blood pressure (shock) from an overdose, emergency treatment is required. These include:    Cold and clammy skin.  Pale skin.  Blue lips.  Very slow breathing.  Extreme sleepiness.  Severe confusion.    DIAGNOSIS  This condition is diagnosed based on your symptoms. It is important to tell your health care provider:    All of the substances that you have taken, including alcohol.  When you took the substances.    Your health care provider will do a physical exam. This exam may include:    Checking and monitoring your heart rate and rhythm, your temperature, and your blood pressure (vital signs).  Checking your breathing and oxygen level.    You may also have tests, including:    Urine tests to check for substances in your system.  Blood tests to check for:  Substances in your system.  Signs of an imbalance in your blood minerals (electrolytes).  Liver damage.  Kidney damage.  Electrocardiogram (ECG). This monitors electrical activity in the heart.    TREATMENT  This condition may require immediate medical treatment and hospitalization. Supporting your vital signs and your breathing is the first step in treating an overdose. Treatment may also include:    Giving fluids and electrolytes through an IV tube.  Inserting a breathing tube (endotracheal tube) in your airway to help you breathe.  Passing a tube through your nose and into your stomach (NG tube, or nasogastric tube) to wash out your stomach.  Giving medicines that:  Absorb any substance that is in your digestive system.  Block or reverse the effect of the substance that caused the overdose.  Filtering your blood through an artificial kidney machine (hemodialysis).  Ongoing counseling and mental health support if you intentionally overdosed or used an illegal drug.    HOME CARE INSTRUCTIONS  Take over-the-counter and prescription medicines only as told by your health care provider. Always ask your health care provider about possible side effects and interactions of any new medicine that you start taking.  Keep a list of all of the medicines that you take, including over-the-counter medicines. Bring this list with you to all of your medical visits.  Drink enough fluid to keep your urine clear or pale yellow.  Keep all follow-up visits as told by your health care provider. This is important.    PREVENTION  Get help if you are struggling with:   Alcohol or drug use.  Depression or another mental health problem.  Keep the phone number of your local poison control center near your phone or on your cell phone.  Store all medicines in safety containers that are out of the reach of children.  Read the drug inserts that come with your medicines.  Do not drink alcohol while taking medicines unless your health care provider approves.  Do not use illegal drugs.  Do not take medicines that are not prescribed for you.    SEEK MEDICAL CARE IF:  Your symptoms return.  You develop new symptoms or side effects when you take medicines.    SEEK IMMEDIATE MEDICAL CARE IF:  You think that you or someone else may have taken too much of a substance. The hotline of the National Poison Control Center is (977) 194-5780.  You or someone else is having symptoms of an overdose.  You have serious thoughts about hurting yourself or others.  You become confused.  You have:  Chest pain.  Difficulty breathing.  A loss of consciousness.    Overdose is an emergency. Do not wait to see if the symptoms will go away. Get medical help right away. Call your local emergency services (911 in the [place] Do not drive yourself to the hospital.    ADDITIONAL NOTES AND INSTRUCTIONS    Please follow up with your Primary MD in 24-48 hr.  Seek immediate medical care for any new/worsening signs or symptoms.

## 2025-06-07 RX ADMIN — KETOROLAC TROMETHAMINE 15 MILLIGRAM(S): 30 INJECTION, SOLUTION INTRAMUSCULAR; INTRAVENOUS at 00:00

## 2025-07-14 ENCOUNTER — APPOINTMENT (OUTPATIENT)
Facility: CLINIC | Age: 55
End: 2025-07-14